# Patient Record
Sex: MALE | Race: WHITE | URBAN - METROPOLITAN AREA
[De-identification: names, ages, dates, MRNs, and addresses within clinical notes are randomized per-mention and may not be internally consistent; named-entity substitution may affect disease eponyms.]

---

## 2017-02-14 ENCOUNTER — NURSE ONLY (OUTPATIENT)
Dept: CARDIOLOGY CLINIC | Age: 77
End: 2017-02-14

## 2017-02-14 DIAGNOSIS — Z95.0 CARDIAC PACEMAKER IN SITU: ICD-10-CM

## 2017-02-14 DIAGNOSIS — R00.1 BRADYCARDIA: ICD-10-CM

## 2017-02-14 PROCEDURE — 93294 REM INTERROG EVL PM/LDLS PM: CPT | Performed by: INTERNAL MEDICINE

## 2017-02-14 PROCEDURE — 93296 REM INTERROG EVL PM/IDS: CPT | Performed by: INTERNAL MEDICINE

## 2017-05-16 ENCOUNTER — NURSE ONLY (OUTPATIENT)
Dept: CARDIOLOGY CLINIC | Age: 77
End: 2017-05-16

## 2017-05-16 DIAGNOSIS — R00.1 BRADYCARDIA: ICD-10-CM

## 2017-05-16 DIAGNOSIS — Z95.0 CARDIAC PACEMAKER IN SITU: ICD-10-CM

## 2017-05-16 PROCEDURE — 93296 REM INTERROG EVL PM/IDS: CPT | Performed by: INTERNAL MEDICINE

## 2017-05-16 PROCEDURE — 93294 REM INTERROG EVL PM/LDLS PM: CPT | Performed by: INTERNAL MEDICINE

## 2017-05-18 ENCOUNTER — TELEPHONE (OUTPATIENT)
Dept: CARDIOLOGY CLINIC | Age: 77
End: 2017-05-18

## 2017-08-15 ENCOUNTER — NURSE ONLY (OUTPATIENT)
Dept: CARDIOLOGY CLINIC | Age: 77
End: 2017-08-15

## 2017-08-15 DIAGNOSIS — Z95.0 CARDIAC PACEMAKER IN SITU: ICD-10-CM

## 2017-08-15 DIAGNOSIS — R00.1 BRADYCARDIA: ICD-10-CM

## 2017-08-15 PROCEDURE — 93296 REM INTERROG EVL PM/IDS: CPT | Performed by: INTERNAL MEDICINE

## 2017-08-15 PROCEDURE — 93294 REM INTERROG EVL PM/LDLS PM: CPT | Performed by: INTERNAL MEDICINE

## 2017-11-14 ENCOUNTER — NURSE ONLY (OUTPATIENT)
Dept: CARDIOLOGY CLINIC | Age: 77
End: 2017-11-14

## 2017-11-14 DIAGNOSIS — R00.1 BRADYCARDIA: ICD-10-CM

## 2017-11-14 DIAGNOSIS — Z95.0 CARDIAC PACEMAKER IN SITU: ICD-10-CM

## 2017-11-14 PROCEDURE — 93296 REM INTERROG EVL PM/IDS: CPT | Performed by: INTERNAL MEDICINE

## 2017-11-14 PROCEDURE — 93294 REM INTERROG EVL PM/LDLS PM: CPT | Performed by: INTERNAL MEDICINE

## 2017-11-27 ENCOUNTER — TELEPHONE (OUTPATIENT)
Dept: CARDIOLOGY CLINIC | Age: 77
End: 2017-11-27

## 2018-01-10 ENCOUNTER — HOSPITAL ENCOUNTER (OUTPATIENT)
Dept: OTHER | Age: 78
Discharge: HOME | End: 2018-01-31
Attending: INTERNAL MEDICINE | Admitting: INTERNAL MEDICINE

## 2018-01-10 LAB
ANION GAP SERPL CALCULATED.3IONS-SCNC: 15 MMOL/L (ref 3–16)
BUN BLDV-MCNC: 25 MG/DL (ref 7–20)
CALCIUM SERPL-MCNC: 9.2 MG/DL (ref 8.3–10.6)
CHLORIDE BLD-SCNC: 101 MMOL/L (ref 99–110)
CO2: 22 MMOL/L (ref 21–32)
CREAT SERPL-MCNC: 1.1 MG/DL (ref 0.8–1.3)
GFR AFRICAN AMERICAN: >60
GFR NON-AFRICAN AMERICAN: >60
GLUCOSE BLD-MCNC: 122 MG/DL (ref 70–99)
POTASSIUM SERPL-SCNC: 5.5 MMOL/L (ref 3.5–5.1)
SODIUM BLD-SCNC: 138 MMOL/L (ref 136–145)

## 2018-02-01 ENCOUNTER — HOSPITAL ENCOUNTER (OUTPATIENT)
Dept: OTHER | Age: 78
Discharge: HOME | End: 2018-02-28
Attending: INTERNAL MEDICINE | Admitting: INTERNAL MEDICINE

## 2019-09-04 ENCOUNTER — HOSPITAL ENCOUNTER (INPATIENT)
Age: 79
LOS: 6 days | Discharge: NURSING, REHAB OR LONG-TERM CARE | DRG: 292 | End: 2019-09-10
Attending: EMERGENCY MEDICINE | Admitting: INTERNAL MEDICINE
Payer: MEDICARE

## 2019-09-04 ENCOUNTER — APPOINTMENT (OUTPATIENT)
Dept: CT IMAGING | Age: 79
DRG: 292 | End: 2019-09-04
Payer: MEDICARE

## 2019-09-04 ENCOUNTER — APPOINTMENT (OUTPATIENT)
Dept: GENERAL RADIOLOGY | Age: 79
DRG: 292 | End: 2019-09-04
Payer: MEDICARE

## 2019-09-04 DIAGNOSIS — R77.8 ELEVATED TROPONIN: Primary | ICD-10-CM
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
## 2019-09-04 LAB
A/G RATIO: 1.1 (ref 1.1–2.2)
ALBUMIN SERPL-MCNC: 3.8 G/DL (ref 3.4–5)
ALP BLD-CCNC: 239 U/L (ref 40–129)
ALT SERPL-CCNC: 27 U/L (ref 10–40)
ANION GAP SERPL CALCULATED.3IONS-SCNC: 12 MMOL/L (ref 3–16)
APTT: 35.4 SEC (ref 26–36)
APTT: 35.8 SEC (ref 26–36)
AST SERPL-CCNC: 34 U/L (ref 15–37)
BASOPHILS ABSOLUTE: 0 K/UL (ref 0–0.2)
BASOPHILS RELATIVE PERCENT: 0.4 %
BILIRUB SERPL-MCNC: 1.9 MG/DL (ref 0–1)
BILIRUBIN URINE: ABNORMAL
BLOOD, URINE: NEGATIVE
BUN BLDV-MCNC: 23 MG/DL (ref 7–20)
CALCIUM SERPL-MCNC: 9.4 MG/DL (ref 8.3–10.6)
CHLORIDE BLD-SCNC: 105 MMOL/L (ref 99–110)
CLARITY: ABNORMAL
CO2: 25 MMOL/L (ref 21–32)
COLOR: ABNORMAL
CREAT SERPL-MCNC: 1.1 MG/DL (ref 0.8–1.3)
EKG ATRIAL RATE: 88 BPM
EKG DIAGNOSIS: NORMAL
EKG Q-T INTERVAL: 484 MS
EKG QRS DURATION: 128 MS
EKG QTC CALCULATION (BAZETT): 525 MS
EKG R AXIS: -76 DEGREES
EKG T AXIS: 63 DEGREES
EKG VENTRICULAR RATE: 71 BPM
EOSINOPHILS ABSOLUTE: 0 K/UL (ref 0–0.6)
EOSINOPHILS RELATIVE PERCENT: 0.3 %
EPITHELIAL CELLS, UA: 1 /HPF (ref 0–5)
GFR AFRICAN AMERICAN: >60
GFR NON-AFRICAN AMERICAN: >60
GLOBULIN: 3.5 G/DL
GLUCOSE BLD-MCNC: 124 MG/DL (ref 70–99)
GLUCOSE BLD-MCNC: 130 MG/DL (ref 70–99)
GLUCOSE URINE: NEGATIVE MG/DL
HCT VFR BLD CALC: 32 % (ref 40.5–52.5)
HEMOGLOBIN: 9.7 G/DL (ref 13.5–17.5)
HYALINE CASTS: 2 /LPF (ref 0–8)
INR BLD: 2.02 (ref 0.86–1.14)
KETONES, URINE: NEGATIVE MG/DL
LEUKOCYTE ESTERASE, URINE: NEGATIVE
LYMPHOCYTES ABSOLUTE: 0.6 K/UL (ref 1–5.1)
LYMPHOCYTES RELATIVE PERCENT: 11.8 %
MCH RBC QN AUTO: 25 PG (ref 26–34)
MCHC RBC AUTO-ENTMCNC: 30.2 G/DL (ref 31–36)
MCV RBC AUTO: 82.7 FL (ref 80–100)
MICROSCOPIC EXAMINATION: YES
MONOCYTES ABSOLUTE: 0.6 K/UL (ref 0–1.3)
MONOCYTES RELATIVE PERCENT: 12.8 %
NEUTROPHILS ABSOLUTE: 3.5 K/UL (ref 1.7–7.7)
NEUTROPHILS RELATIVE PERCENT: 74.7 %
NITRITE, URINE: NEGATIVE
PDW BLD-RTO: 20 % (ref 12.4–15.4)
PERFORMED ON: ABNORMAL
PH UA: 5 (ref 5–8)
PLATELET # BLD: 140 K/UL (ref 135–450)
PMV BLD AUTO: 7.9 FL (ref 5–10.5)
POTASSIUM SERPL-SCNC: 4.6 MMOL/L (ref 3.5–5.1)
PRO-BNP: 3444 PG/ML (ref 0–449)
PROTEIN UA: 100 MG/DL
PROTHROMBIN TIME: 23 SEC (ref 9.8–13)
RBC # BLD: 3.86 M/UL (ref 4.2–5.9)
RBC UA: 6 /HPF (ref 0–4)
SODIUM BLD-SCNC: 142 MMOL/L (ref 136–145)
SPECIFIC GRAVITY UA: 1.02 (ref 1–1.03)
TOTAL CK: 204 U/L (ref 39–308)
TOTAL PROTEIN: 7.3 G/DL (ref 6.4–8.2)
TROPONIN: 0.04 NG/ML
TROPONIN: 0.04 NG/ML
URINE REFLEX TO CULTURE: ABNORMAL
URINE TYPE: ABNORMAL
UROBILINOGEN, URINE: 0.2 E.U./DL
WBC # BLD: 4.7 K/UL (ref 4–11)
WBC UA: 2 /HPF (ref 0–5)

## 2019-09-04 PROCEDURE — 83880 ASSAY OF NATRIURETIC PEPTIDE: CPT

## 2019-09-04 PROCEDURE — 99285 EMERGENCY DEPT VISIT HI MDM: CPT

## 2019-09-04 PROCEDURE — 6360000002 HC RX W HCPCS: Performed by: INTERNAL MEDICINE

## 2019-09-04 PROCEDURE — 71045 X-RAY EXAM CHEST 1 VIEW: CPT

## 2019-09-04 PROCEDURE — 85025 COMPLETE CBC W/AUTO DIFF WBC: CPT

## 2019-09-04 PROCEDURE — 73502 X-RAY EXAM HIP UNI 2-3 VIEWS: CPT

## 2019-09-04 PROCEDURE — 93005 ELECTROCARDIOGRAM TRACING: CPT | Performed by: NURSE PRACTITIONER

## 2019-09-04 PROCEDURE — 82550 ASSAY OF CK (CPK): CPT

## 2019-09-04 PROCEDURE — 6370000000 HC RX 637 (ALT 250 FOR IP): Performed by: INTERNAL MEDICINE

## 2019-09-04 PROCEDURE — 80053 COMPREHEN METABOLIC PANEL: CPT

## 2019-09-04 PROCEDURE — 36415 COLL VENOUS BLD VENIPUNCTURE: CPT

## 2019-09-04 PROCEDURE — 85610 PROTHROMBIN TIME: CPT

## 2019-09-04 PROCEDURE — 73590 X-RAY EXAM OF LOWER LEG: CPT

## 2019-09-04 PROCEDURE — 84484 ASSAY OF TROPONIN QUANT: CPT

## 2019-09-04 PROCEDURE — 2060000000 HC ICU INTERMEDIATE R&B

## 2019-09-04 PROCEDURE — 81001 URINALYSIS AUTO W/SCOPE: CPT

## 2019-09-04 PROCEDURE — 85730 THROMBOPLASTIN TIME PARTIAL: CPT

## 2019-09-04 PROCEDURE — 93010 ELECTROCARDIOGRAM REPORT: CPT | Performed by: INTERNAL MEDICINE

## 2019-09-04 PROCEDURE — 2580000003 HC RX 258: Performed by: INTERNAL MEDICINE

## 2019-09-04 PROCEDURE — 70450 CT HEAD/BRAIN W/O DYE: CPT

## 2019-09-04 PROCEDURE — 6370000000 HC RX 637 (ALT 250 FOR IP): Performed by: EMERGENCY MEDICINE

## 2019-09-04 RX ORDER — LOSARTAN POTASSIUM 100 MG/1
100 TABLET ORAL NIGHTLY
Status: DISCONTINUED | OUTPATIENT
Start: 2019-09-04 | End: 2019-09-06

## 2019-09-04 RX ORDER — ATORVASTATIN CALCIUM 40 MG/1
40 TABLET, FILM COATED ORAL NIGHTLY
Status: DISCONTINUED | OUTPATIENT
Start: 2019-09-04 | End: 2019-09-10 | Stop reason: HOSPADM

## 2019-09-04 RX ORDER — HEPARIN SODIUM 1000 [USP'U]/ML
4000 INJECTION, SOLUTION INTRAVENOUS; SUBCUTANEOUS PRN
Status: DISCONTINUED | OUTPATIENT
Start: 2019-09-04 | End: 2019-09-05

## 2019-09-04 RX ORDER — SODIUM CHLORIDE 0.9 % (FLUSH) 0.9 %
10 SYRINGE (ML) INJECTION PRN
Status: DISCONTINUED | OUTPATIENT
Start: 2019-09-04 | End: 2019-09-10 | Stop reason: HOSPADM

## 2019-09-04 RX ORDER — NICOTINE POLACRILEX 4 MG
15 LOZENGE BUCCAL PRN
Status: DISCONTINUED | OUTPATIENT
Start: 2019-09-04 | End: 2019-09-10 | Stop reason: HOSPADM

## 2019-09-04 RX ORDER — FINASTERIDE 5 MG/1
5 TABLET, FILM COATED ORAL DAILY
Status: DISCONTINUED | OUTPATIENT
Start: 2019-09-05 | End: 2019-09-05

## 2019-09-04 RX ORDER — HYDROCODONE BITARTRATE AND ACETAMINOPHEN 5; 325 MG/1; MG/1
1 TABLET ORAL EVERY 6 HOURS PRN
Status: DISCONTINUED | OUTPATIENT
Start: 2019-09-04 | End: 2019-09-10 | Stop reason: HOSPADM

## 2019-09-04 RX ORDER — HYDROCHLOROTHIAZIDE 25 MG/1
25 TABLET ORAL DAILY
Status: DISCONTINUED | OUTPATIENT
Start: 2019-09-04 | End: 2019-09-05

## 2019-09-04 RX ORDER — ATENOLOL 25 MG/1
25 TABLET ORAL 2 TIMES DAILY
Status: DISCONTINUED | OUTPATIENT
Start: 2019-09-04 | End: 2019-09-10 | Stop reason: HOSPADM

## 2019-09-04 RX ORDER — ASPIRIN 81 MG/1
81 TABLET, CHEWABLE ORAL DAILY
Status: DISCONTINUED | OUTPATIENT
Start: 2019-09-04 | End: 2019-09-10 | Stop reason: HOSPADM

## 2019-09-04 RX ORDER — HEPARIN SODIUM 10000 [USP'U]/100ML
9.2 INJECTION, SOLUTION INTRAVENOUS CONTINUOUS
Status: DISCONTINUED | OUTPATIENT
Start: 2019-09-04 | End: 2019-09-05

## 2019-09-04 RX ORDER — DILTIAZEM HYDROCHLORIDE 60 MG/1
240 CAPSULE, EXTENDED RELEASE ORAL DAILY
Status: DISCONTINUED | OUTPATIENT
Start: 2019-09-05 | End: 2019-09-05

## 2019-09-04 RX ORDER — DEXTROSE MONOHYDRATE 25 G/50ML
12.5 INJECTION, SOLUTION INTRAVENOUS PRN
Status: DISCONTINUED | OUTPATIENT
Start: 2019-09-04 | End: 2019-09-10 | Stop reason: HOSPADM

## 2019-09-04 RX ORDER — HEPARIN SODIUM 1000 [USP'U]/ML
4000 INJECTION, SOLUTION INTRAVENOUS; SUBCUTANEOUS ONCE
Status: COMPLETED | OUTPATIENT
Start: 2019-09-04 | End: 2019-09-04

## 2019-09-04 RX ORDER — DEXTROSE MONOHYDRATE 50 MG/ML
100 INJECTION, SOLUTION INTRAVENOUS PRN
Status: DISCONTINUED | OUTPATIENT
Start: 2019-09-04 | End: 2019-09-10 | Stop reason: HOSPADM

## 2019-09-04 RX ORDER — AMOXICILLIN 250 MG/1
500 CAPSULE ORAL 2 TIMES DAILY
Status: DISCONTINUED | OUTPATIENT
Start: 2019-09-04 | End: 2019-09-04 | Stop reason: ALTCHOICE

## 2019-09-04 RX ORDER — ONDANSETRON 2 MG/ML
4 INJECTION INTRAMUSCULAR; INTRAVENOUS EVERY 6 HOURS PRN
Status: DISCONTINUED | OUTPATIENT
Start: 2019-09-04 | End: 2019-09-10 | Stop reason: HOSPADM

## 2019-09-04 RX ORDER — HYDROCODONE BITARTRATE AND ACETAMINOPHEN 5; 325 MG/1; MG/1
2 TABLET ORAL ONCE
Status: COMPLETED | OUTPATIENT
Start: 2019-09-04 | End: 2019-09-04

## 2019-09-04 RX ORDER — HEPARIN SODIUM 1000 [USP'U]/ML
2000 INJECTION, SOLUTION INTRAVENOUS; SUBCUTANEOUS PRN
Status: DISCONTINUED | OUTPATIENT
Start: 2019-09-04 | End: 2019-09-05

## 2019-09-04 RX ORDER — SODIUM CHLORIDE 0.9 % (FLUSH) 0.9 %
10 SYRINGE (ML) INJECTION EVERY 12 HOURS SCHEDULED
Status: DISCONTINUED | OUTPATIENT
Start: 2019-09-04 | End: 2019-09-10 | Stop reason: HOSPADM

## 2019-09-04 RX ADMIN — HYDROCODONE BITARTRATE AND ACETAMINOPHEN 2 TABLET: 5; 325 TABLET ORAL at 16:26

## 2019-09-04 RX ADMIN — DESMOPRESSIN ACETATE 40 MG: 0.2 TABLET ORAL at 23:30

## 2019-09-04 RX ADMIN — ASPIRIN 81 MG 81 MG: 81 TABLET ORAL at 23:30

## 2019-09-04 RX ADMIN — HYDROCHLOROTHIAZIDE 25 MG: 25 TABLET ORAL at 23:30

## 2019-09-04 RX ADMIN — Medication 10 ML: at 23:31

## 2019-09-04 RX ADMIN — HEPARIN SODIUM 4000 UNITS: 1000 INJECTION, SOLUTION INTRAVENOUS; SUBCUTANEOUS at 23:32

## 2019-09-04 RX ADMIN — HEPARIN SODIUM AND DEXTROSE 13.2 UNITS/KG/HR: 10000; 5 INJECTION INTRAVENOUS at 23:32

## 2019-09-04 RX ADMIN — ATENOLOL 25 MG: 25 TABLET ORAL at 23:30

## 2019-09-04 ASSESSMENT — PAIN SCALES - GENERAL
PAINLEVEL_OUTOF10: 6
PAINLEVEL_OUTOF10: 8
PAINLEVEL_OUTOF10: 0
PAINLEVEL_OUTOF10: 4

## 2019-09-04 ASSESSMENT — ENCOUNTER SYMPTOMS
ABDOMINAL PAIN: 0
CHEST TIGHTNESS: 0
SHORTNESS OF BREATH: 0
DIARRHEA: 0
NAUSEA: 0
VOMITING: 0

## 2019-09-04 ASSESSMENT — PAIN DESCRIPTION - LOCATION
LOCATION_2: LEG
LOCATION: HIP

## 2019-09-04 ASSESSMENT — PAIN DESCRIPTION - PAIN TYPE: TYPE: ACUTE PAIN

## 2019-09-04 ASSESSMENT — PAIN DESCRIPTION - INTENSITY: RATING_2: 8

## 2019-09-04 ASSESSMENT — PAIN DESCRIPTION - ORIENTATION: ORIENTATION: RIGHT

## 2019-09-04 NOTE — ED NOTES
(GLUCOPHAGE) 1000 MG tablet Take 1,000 mg by mouth 2 times daily (with meals). L-Methylfolate-B6-B12 2.8-25-2 MG TABS Take  by mouth 2 times daily. [DISCONTINUED] Dutasteride-Tamsulosin HCl (ARIELA) 0.5-0.4 MG CAPS Take  by mouth. [DISCONTINUED] fish oil-omega-3 fatty acids 1000 MG capsule Take 2 g by mouth daily. [DISCONTINUED] aspirin 325 MG EC tablet Take 325 mg by mouth daily.

## 2019-09-04 NOTE — ED PROVIDER NOTES
[address] [facility]        Pt Name: Jimena Guadalupe  MRN: [id number]  [place] 1940  Date of evaluation: 9/4/2019  Provider: Marylene Berg, APRN - CNP  PCP: Jhonny Lawson MD    This patient was seen and evaluated by the attending physician [facility]       Chief Complaint   Patient presents with    Fall     pt brought in by [place] [facility], pt states he tripped over his feet this am and fell, c/o right hip and left ankle pain. Pt denies hitting his head       HISTORY OF PRESENT ILLNESS   (Location/Symptom, Timing/Onset, Context/Setting, Quality, Duration, Modifying Factors, Severity)  Note limiting factors. Jimena Guadalupe is a 78 y.o. male presents to the emergency department after falling this morning. The patient reports that he had to void, got up to use a urinal at 6. States that he went back to bed because his wife and not awaken. Reports that he needed to void again at 8 but did not have a urinal available upstairs, got on the stair lift to go down the stairs and the stair lift chair failed, states that he \"bailed out\", he was on the ground from 8:00 this morning until about 1300 today. It is unclear how he was found. Patient is complaining of pain to the left tib-fib and left hip. Denies other injury. Does not believe that he hit his head, denies loss of consciousness. Denies any headache, fever, lightheadedness, dizziness, visual disturbances. No chest pain or pressure. No neck or back pain. No shortness of breath, cough, or congestion. No abdominal pain, nausea, vomiting, diarrhea, constipation, or dysuria. Nursing Notes were all reviewed and agreed with or any disagreements were addressed  in the HPI. REVIEW OF SYSTEMS    (2-9 systems for level 4, 10 or more for level 5)     Review of Systems   Constitutional: Negative for activity change, chills and fever.    Respiratory: Negative for chest tightness and nightly      amiodarone (CORDARONE) 200 MG tablet Take 200 mg by mouth daily  Refills: 4      b complex vitamins tablet Take 1 tablet by mouth daily      candesartan (ATACAND) 32 MG tablet Take 32 mg by mouth daily  Refills: 1      docusate sodium (COLACE) 100 MG capsule Take 100 mg by mouth daily as needed      Polysaccharide Iron Complex 391.3 (180 Fe) MG CAPS Take 180 mg by mouth daily      rivaroxaban (XARELTO) 15 MG TABS tablet Take 15 mg by mouth Daily with supper      metFORMIN (GLUCOPHAGE) 1000 MG tablet Take 500 mg by mouth 2 times daily (with meals)               ALLERGIES     Patient has no known allergies. FAMILYHISTORY     No family history on file.        SOCIAL HISTORY       Social History     Socioeconomic History    Marital status:      Spouse name: Not on file    Number of children: Not on file    Years of education: Not on file    Highest education level: Not on file   Occupational History    Not on file   Social Needs    Financial resource strain: Not on file    Food insecurity:     Worry: Not on file     Inability: Not on file    Transportation needs:     Medical: Not on file     Non-medical: Not on file   Tobacco Use    Smoking status: Never Smoker    Smokeless tobacco: Never Used   Substance and Sexual Activity    Alcohol use: No    Drug use: No    Sexual activity: Not on file   Lifestyle    Physical activity:     Days per week: Not on file     Minutes per session: Not on file    Stress: Not on file   Relationships    Social connections:     Talks on phone: Not on file     Gets together: Not on file     Attends Anabaptist service: Not on file     Active member of club or organization: Not on file     Attends meetings of clubs or organizations: Not on file     Relationship status: Not on file    Intimate partner violence:     Fear of current or ex partner: Not on file     Emotionally abused: Not on file     Physically abused: Not on file     Forced sexual activity: Not on file   Other Topics Concern    Not on file   Social History Narrative    Not on file       SCREENINGS    Betty Coma Scale  Eye Opening: Spontaneous  Best Verbal Response: Oriented  Best Motor Response: Obeys commands  [place] Coma Scale Score: 15        PHYSICAL EXAM    (up to 7 for level 4, 8 or more for level 5)     ED Triage Vitals [09/04/19 1310]   BP Temp Temp Source Pulse Resp SpO2 Height Weight   (!) 155/84 98.2 °F (36.8 °C) Oral 75 21 (!) 88 % 6' 2\" (1.88 m) 240 lb (108.9 kg)       Physical Exam   Constitutional: He is oriented to person, place, and time. He appears well-developed and well-nourished. HENT:   Head: Normocephalic and atraumatic. Right Ear: External ear normal.   Left Ear: External ear normal.   Eyes: Right eye exhibits no discharge. Left eye exhibits no discharge. Neck: Normal range of motion. Neck supple. No JVD present. Cardiovascular: Normal rate and regular rhythm. Pulmonary/Chest: Effort normal. No respiratory distress. Abdominal: Soft. He exhibits no mass. There is no tenderness. Musculoskeletal: Normal range of motion. Left hip: He exhibits tenderness. Left lower leg: He exhibits tenderness. Neurological: He is alert and oriented to person, place, and time. Skin: Skin is warm and dry. He is not diaphoretic. No pallor. Psychiatric: He has a normal mood and affect. His behavior is normal.   Nursing note and vitals reviewed.       DIAGNOSTIC RESULTS   LABS:    Labs Reviewed   CBC WITH AUTO DIFFERENTIAL - Abnormal; Notable for the following components:       Result Value    RBC 3.86 (*)     Hemoglobin 9.7 (*)     Hematocrit 32.0 (*)     MCH 25.0 (*)     MCHC 30.2 (*)     RDW 20.0 (*)     Lymphocytes Absolute 0.6 (*)     All other components within normal limits    Narrative:     Performed at:  [facility] [address], [place], [address]   Phone [phone]   COMPREHENSIVE METABOLIC PANEL - Abnormal; Notable 09/04/19 14:42:23   Procedure changed from XR HIP LEFT (2-3 VIEWS)   Final result by Ceci Hayes MD (09/04/19 14:42:23)                Impression:    No acute bone or joint abnormality.                  CT Head WO Contrast (Final result)   Result time 09/04/19 14:15:59   Final result by Maci Padilla MD (09/04/19 14:15:59)                Impression:    No acute intracranial abnormality. Patient be disposition by attending physician as his visit did exceed the length of my shift. FINAL IMPRESSION      1. Elevated troponin    2.  Fall, initial encounter          DISPOSITION/PLAN   DISPOSITION Admitted 09/04/2019 06:02:55 PM      PATIENT REFERREDTO:  Damir Bee MD  [facility]  [address]  [phone]            DISCHARGE MEDICATIONS:  Current Discharge Medication List          DISCONTINUED MEDICATIONS:  Current Discharge Medication List      STOP taking these medications       glimepiride (AMARYL) 1 MG tablet Comments:   Reason for Stopping:         hydrochlorothiazide (HYDRODIURIL) 25 MG tablet Comments:   Reason for Stopping:         pioglitazone (ACTOS) 15 MG tablet Comments:   Reason for Stopping:         dutasteride (AVODART) 0.5 MG capsule Comments:   Reason for Stopping:         diltiazem (DILACOR XR) 240 MG XR capsule Comments:   Reason for Stopping:         Vitamin D (CHOLECALCIFEROL) 1000 UNITS CAPS capsule Comments:   Reason for Stopping:         Multiple Vitamins-Minerals (CENTRUM SILVER) TABS Comments:   Reason for Stopping:         irbesartan (AVAPRO) 300 MG tablet Comments:   Reason for Stopping:         L-Methylfolate-B6-B12 2.8-25-2 MG TABS Comments:   Reason for Stopping:                      (Please note that portions ofthis note were completed with a voice recognition program.  Efforts were made to edit the dictations but occasionally words are mis-transcribed.)    DARRION Merchant - CNP (electronically signed)           Claude Bhakta

## 2019-09-05 LAB
A/G RATIO: 1 (ref 1.1–2.2)
ALBUMIN SERPL-MCNC: 3.4 G/DL (ref 3.4–5)
ALP BLD-CCNC: 222 U/L (ref 40–129)
ALT SERPL-CCNC: 28 U/L (ref 10–40)
ANION GAP SERPL CALCULATED.3IONS-SCNC: 9 MMOL/L (ref 3–16)
APTT: 35.2 SEC (ref 26–36)
APTT: 37.3 SEC (ref 26–36)
APTT: 98.4 SEC (ref 26–36)
AST SERPL-CCNC: 38 U/L (ref 15–37)
BILIRUB SERPL-MCNC: 1.7 MG/DL (ref 0–1)
BUN BLDV-MCNC: 24 MG/DL (ref 7–20)
CALCIUM SERPL-MCNC: 9.1 MG/DL (ref 8.3–10.6)
CHLORIDE BLD-SCNC: 106 MMOL/L (ref 99–110)
CO2: 26 MMOL/L (ref 21–32)
CREAT SERPL-MCNC: 1.2 MG/DL (ref 0.8–1.3)
GFR AFRICAN AMERICAN: >60
GFR NON-AFRICAN AMERICAN: 58
GLOBULIN: 3.5 G/DL
GLUCOSE BLD-MCNC: 128 MG/DL (ref 70–99)
GLUCOSE BLD-MCNC: 148 MG/DL (ref 70–99)
GLUCOSE BLD-MCNC: 161 MG/DL (ref 70–99)
GLUCOSE BLD-MCNC: 179 MG/DL (ref 70–99)
GLUCOSE BLD-MCNC: 179 MG/DL (ref 70–99)
GLUCOSE BLD-MCNC: 192 MG/DL (ref 70–99)
GLUCOSE BLD-MCNC: 195 MG/DL (ref 70–99)
HCT VFR BLD CALC: 30.8 % (ref 40.5–52.5)
HEMOGLOBIN: 9.4 G/DL (ref 13.5–17.5)
LV EF: 35 %
LVEF MODALITY: NORMAL
MCH RBC QN AUTO: 25.1 PG (ref 26–34)
MCHC RBC AUTO-ENTMCNC: 30.4 G/DL (ref 31–36)
MCV RBC AUTO: 82.7 FL (ref 80–100)
PDW BLD-RTO: 21 % (ref 12.4–15.4)
PERFORMED ON: ABNORMAL
PLATELET # BLD: 135 K/UL (ref 135–450)
PMV BLD AUTO: 8.1 FL (ref 5–10.5)
POTASSIUM REFLEX MAGNESIUM: 5.1 MMOL/L (ref 3.5–5.1)
RBC # BLD: 3.73 M/UL (ref 4.2–5.9)
SODIUM BLD-SCNC: 141 MMOL/L (ref 136–145)
TOTAL PROTEIN: 6.9 G/DL (ref 6.4–8.2)
TROPONIN: 0.04 NG/ML
WBC # BLD: 3.9 K/UL (ref 4–11)

## 2019-09-05 PROCEDURE — 6360000002 HC RX W HCPCS: Performed by: INTERNAL MEDICINE

## 2019-09-05 PROCEDURE — 6370000000 HC RX 637 (ALT 250 FOR IP): Performed by: HOSPITALIST

## 2019-09-05 PROCEDURE — 85027 COMPLETE CBC AUTOMATED: CPT

## 2019-09-05 PROCEDURE — 6370000000 HC RX 637 (ALT 250 FOR IP): Performed by: INTERNAL MEDICINE

## 2019-09-05 PROCEDURE — 80053 COMPREHEN METABOLIC PANEL: CPT

## 2019-09-05 PROCEDURE — 2580000003 HC RX 258: Performed by: INTERNAL MEDICINE

## 2019-09-05 PROCEDURE — 94640 AIRWAY INHALATION TREATMENT: CPT

## 2019-09-05 PROCEDURE — 2700000000 HC OXYGEN THERAPY PER DAY

## 2019-09-05 PROCEDURE — 93306 TTE W/DOPPLER COMPLETE: CPT

## 2019-09-05 PROCEDURE — 84484 ASSAY OF TROPONIN QUANT: CPT

## 2019-09-05 PROCEDURE — 94761 N-INVAS EAR/PLS OXIMETRY MLT: CPT

## 2019-09-05 PROCEDURE — 85730 THROMBOPLASTIN TIME PARTIAL: CPT

## 2019-09-05 PROCEDURE — 6360000002 HC RX W HCPCS: Performed by: HOSPITALIST

## 2019-09-05 PROCEDURE — 36415 COLL VENOUS BLD VENIPUNCTURE: CPT

## 2019-09-05 PROCEDURE — 99223 1ST HOSP IP/OBS HIGH 75: CPT | Performed by: INTERNAL MEDICINE

## 2019-09-05 PROCEDURE — 2060000000 HC ICU INTERMEDIATE R&B

## 2019-09-05 RX ORDER — TAMSULOSIN HYDROCHLORIDE 0.4 MG/1
0.4 CAPSULE ORAL DAILY
Status: DISCONTINUED | OUTPATIENT
Start: 2019-09-05 | End: 2019-09-10 | Stop reason: HOSPADM

## 2019-09-05 RX ORDER — TORSEMIDE 10 MG/1
50 TABLET ORAL DAILY
Status: ON HOLD | COMMUNITY
Start: 2019-07-17 | End: 2019-09-10 | Stop reason: HOSPADM

## 2019-09-05 RX ORDER — ALFUZOSIN HYDROCHLORIDE 10 MG/1
10 TABLET, EXTENDED RELEASE ORAL NIGHTLY
COMMUNITY

## 2019-09-05 RX ORDER — PANTOPRAZOLE SODIUM 40 MG/1
40 TABLET, DELAYED RELEASE ORAL DAILY
COMMUNITY
Start: 2019-06-15

## 2019-09-05 RX ORDER — DOCUSATE SODIUM 100 MG/1
100 CAPSULE, LIQUID FILLED ORAL DAILY PRN
COMMUNITY

## 2019-09-05 RX ORDER — CANDESARTAN 32 MG/1
32 TABLET ORAL DAILY
Refills: 1 | COMMUNITY
Start: 2019-05-29

## 2019-09-05 RX ORDER — IPRATROPIUM BROMIDE AND ALBUTEROL SULFATE 2.5; .5 MG/3ML; MG/3ML
1 SOLUTION RESPIRATORY (INHALATION)
Status: DISCONTINUED | OUTPATIENT
Start: 2019-09-05 | End: 2019-09-10 | Stop reason: HOSPADM

## 2019-09-05 RX ORDER — FUROSEMIDE 10 MG/ML
20 INJECTION INTRAMUSCULAR; INTRAVENOUS 2 TIMES DAILY
Status: DISCONTINUED | OUTPATIENT
Start: 2019-09-05 | End: 2019-09-06

## 2019-09-05 RX ORDER — FUROSEMIDE 10 MG/ML
40 INJECTION INTRAMUSCULAR; INTRAVENOUS ONCE
Status: COMPLETED | OUTPATIENT
Start: 2019-09-05 | End: 2019-09-05

## 2019-09-05 RX ORDER — AMIODARONE HYDROCHLORIDE 200 MG/1
200 TABLET ORAL DAILY
Status: DISCONTINUED | OUTPATIENT
Start: 2019-09-05 | End: 2019-09-10 | Stop reason: HOSPADM

## 2019-09-05 RX ORDER — AMIODARONE HYDROCHLORIDE 200 MG/1
200 TABLET ORAL DAILY
Refills: 4 | COMMUNITY
Start: 2019-05-29

## 2019-09-05 RX ORDER — AMOXICILLIN 250 MG/1
500 CAPSULE ORAL 2 TIMES DAILY
Status: DISCONTINUED | OUTPATIENT
Start: 2019-09-05 | End: 2019-09-10

## 2019-09-05 RX ORDER — ACETAMINOPHEN 500 MG
500 TABLET ORAL EVERY 6 HOURS PRN
COMMUNITY

## 2019-09-05 RX ADMIN — DILTIAZEM HYDROCHLORIDE 240 MG: 60 CAPSULE, EXTENDED RELEASE ORAL at 09:21

## 2019-09-05 RX ADMIN — RIVAROXABAN 15 MG: 15 TABLET, FILM COATED ORAL at 12:28

## 2019-09-05 RX ADMIN — IPRATROPIUM BROMIDE AND ALBUTEROL SULFATE 1 AMPULE: .5; 3 SOLUTION RESPIRATORY (INHALATION) at 16:16

## 2019-09-05 RX ADMIN — VITAMIN D, TAB 1000IU (100/BT) 1000 UNITS: 25 TAB at 09:21

## 2019-09-05 RX ADMIN — ASPIRIN 81 MG 81 MG: 81 TABLET ORAL at 09:21

## 2019-09-05 RX ADMIN — INSULIN LISPRO 1 UNITS: 100 INJECTION, SOLUTION INTRAVENOUS; SUBCUTANEOUS at 00:08

## 2019-09-05 RX ADMIN — INSULIN LISPRO 1 UNITS: 100 INJECTION, SOLUTION INTRAVENOUS; SUBCUTANEOUS at 13:38

## 2019-09-05 RX ADMIN — AMOXICILLIN 500 MG: 250 CAPSULE ORAL at 09:21

## 2019-09-05 RX ADMIN — INSULIN LISPRO 1 UNITS: 100 INJECTION, SOLUTION INTRAVENOUS; SUBCUTANEOUS at 18:01

## 2019-09-05 RX ADMIN — Medication 10 ML: at 09:21

## 2019-09-05 RX ADMIN — FINASTERIDE 5 MG: 5 TABLET, FILM COATED ORAL at 09:21

## 2019-09-05 RX ADMIN — FUROSEMIDE 20 MG: 10 INJECTION, SOLUTION INTRAMUSCULAR; INTRAVENOUS at 19:42

## 2019-09-05 RX ADMIN — AMOXICILLIN 500 MG: 250 CAPSULE ORAL at 21:36

## 2019-09-05 RX ADMIN — VITAMIN D, TAB 1000IU (100/BT) 1000 UNITS: 25 TAB at 00:06

## 2019-09-05 RX ADMIN — HYDROCHLOROTHIAZIDE 25 MG: 25 TABLET ORAL at 09:21

## 2019-09-05 RX ADMIN — LOSARTAN POTASSIUM 100 MG: 100 TABLET, FILM COATED ORAL at 21:33

## 2019-09-05 RX ADMIN — AMOXICILLIN 500 MG: 250 CAPSULE ORAL at 01:30

## 2019-09-05 RX ADMIN — LOSARTAN POTASSIUM 100 MG: 100 TABLET, FILM COATED ORAL at 00:06

## 2019-09-05 RX ADMIN — IPRATROPIUM BROMIDE AND ALBUTEROL SULFATE 1 AMPULE: .5; 3 SOLUTION RESPIRATORY (INHALATION) at 19:44

## 2019-09-05 RX ADMIN — ATENOLOL 25 MG: 25 TABLET ORAL at 21:33

## 2019-09-05 RX ADMIN — ATENOLOL 25 MG: 25 TABLET ORAL at 09:20

## 2019-09-05 RX ADMIN — DESMOPRESSIN ACETATE 40 MG: 0.2 TABLET ORAL at 21:33

## 2019-09-05 RX ADMIN — INSULIN LISPRO 1 UNITS: 100 INJECTION, SOLUTION INTRAVENOUS; SUBCUTANEOUS at 21:30

## 2019-09-05 RX ADMIN — FUROSEMIDE 40 MG: 10 INJECTION, SOLUTION INTRAMUSCULAR; INTRAVENOUS at 12:22

## 2019-09-05 RX ADMIN — TAMSULOSIN HYDROCHLORIDE 0.4 MG: 0.4 CAPSULE ORAL at 12:28

## 2019-09-05 RX ADMIN — AMIODARONE HYDROCHLORIDE 200 MG: 200 TABLET ORAL at 12:23

## 2019-09-05 ASSESSMENT — PAIN SCALES - GENERAL
PAINLEVEL_OUTOF10: 0
PAINLEVEL_OUTOF10: 0
PAINLEVEL_OUTOF10: 5
PAINLEVEL_OUTOF10: 0
PAINLEVEL_OUTOF10: 0
PAINLEVEL_OUTOF10: 6
PAINLEVEL_OUTOF10: 0
PAINLEVEL_OUTOF10: 0

## 2019-09-05 ASSESSMENT — PAIN DESCRIPTION - ORIENTATION
ORIENTATION: LEFT;LOWER
ORIENTATION: RIGHT;LEFT

## 2019-09-05 ASSESSMENT — PAIN DESCRIPTION - LOCATION
LOCATION: SHOULDER
LOCATION: LEG

## 2019-09-05 ASSESSMENT — PAIN DESCRIPTION - PAIN TYPE
TYPE: ACUTE PAIN
TYPE: ACUTE PAIN

## 2019-09-05 NOTE — PROGRESS NOTES
Objective:  /66   Pulse 70   Temp 97.7 °F (36.5 °C) (Oral)   Resp 16   Ht 6' 2\" (1.88 m)   Wt 260 lb 6.4 oz (118.1 kg)   SpO2 96%   BMI 33.43 kg/m²     Intake/Output Summary (Last 24 hours) at 9/5/2019 1330  Last data filed at 9/5/2019 1000  Gross per 24 hour   Intake 540 ml   Output --   Net 540 ml    Wt Readings from Last 3 Encounters:   09/05/19 260 lb 6.4 oz (118.1 kg)   07/10/14 259 lb (117.5 kg)   06/17/14 268 lb (121.6 kg)       General appearance: Elderly  male, appears comfortable  Eyes: Sclera clear. Pupils equal.  ENT: Moist oral mucosa. Trachea midline, no adenopathy. Cardiovascular: Regular rhythm, normal S1, S2. No murmur. No edema in lower extremities, pacemaker intact in left anterior chest wall  Respiratory: Not using accessory muscles. Good inspiratory effort. Left basilar diminished lung sounds noted bilateral both sides wheezing. GI: Abdomen soft, no tenderness, not distended, normal bowel sounds  Musculoskeletal: No cyanosis in digits, neck supple  Neurology: CN 2-12 grossly intact. No speech or motor deficits  Psych: Normal affect. Alert and oriented in time, place and person  Skin: Warm, dry, normal turgor    Labs and Tests:  CBC:   Recent Labs     09/04/19 1427 09/05/19  0435   WBC 4.7 3.9*   HGB 9.7* 9.4*    135     BMP:  Recent Labs     09/04/19  1427 09/05/19  0435    141   K 4.6 5.1    106   CO2 25 26   BUN 23* 24*   CREATININE 1.1 1.2   GLUCOSE 130* 148*     Hepatic: Recent Labs     09/04/19  1427 09/05/19  0435   AST 34 38*   ALT 27 28   BILITOT 1.9* 1.7*   ALKPHOS 239* 222*         Problem List  Active Problems:    Fall    Elevated troponin  Resolved Problems:    * No resolved hospital problems. *       Assessment & Plan: 1. Mechanical injury to the left leg no evidence of fracture. Physical and occupational therapy consulted   2.   Patient looks like in fluid overload exam showed anasarca around belly,  thighs and lower legs he has anasarca, albumin and globulin level looks like normal, we will check 2D echo with Doppler, wheezing noted in the we will start exam, breathing treatments and monitor. Continue with strict intake and output and daily weights, continue Lasix 20 mg twice daily intravenously. 3.Elevated troponin  0.04, Hx of CAD+ status post CABG+ status post pacemaker. cardiology consulted, stable ischemic heart disease, troponin elevation likely secondary to mild congestion, stop heparin drip   4. Blood pressure stable continue atenolol  5. Type 2 diabetes mellitus, continue sliding scale insulin  6.   Paroxysmal atrial fibrillation, continue Xarelto rate controlled            Diet: DIET CARB CONTROL; Carb Control: 4 carb choices (60 gms)/meal; Daily Fluid Restriction: 2000 ml  Code:Full Code  DVT PPX lovenox       Felix Hernandez MD   9/5/2019 1:30 PM

## 2019-09-05 NOTE — PLAN OF CARE
Problem: Falls - Risk of:  Goal: Will remain free from falls  Description  Will remain free from falls  Outcome: Met This Shift  Note:   Pt remains free from falls. Safety precautions in place. Bed in lowest position, bed wheels locked, call light with in reach, bed alarm and non-skid socks on, yellow blanket in place, fall risk wrist band on. Oriented pt to the floor, room, and call light. Will continue to monitor. Problem: Risk for Impaired Skin Integrity  Goal: Tissue integrity - skin and mucous membranes  Description  Structural intactness and normal physiological function of skin and  mucous membranes. Outcome: Ongoing  Note:   Pt has generalized bruising BUE, skin tear on left upper arm, R upper arm above elbow and BLE. All extremities are swollen. Mepilex dressing is applied to skin tear on BUE. Will continue assess his skin integrity, change dressing in need. Problem: Pain:  Goal: Pain level will decrease  Description  Pain level will decrease  Outcome: Met This Shift  Note:   Pt complains lower leg pain r/t recent fall, but refuses to taken any pain medicine. Pt states that he does not need pain relief med at the moment.

## 2019-09-05 NOTE — ED PROVIDER NOTES
I  cared for and evaluated the patient in conjunction with the ED Advanced Practice Provider    [address] [facility] D Martha Joshua is a 78 y.o. male presents to the emergency department chief complaint of fall that occurred at home. The patient states that he fell on his stairs at home he denies a loss of consciousness but states that he was unable to stand afterwards and laid on the floor for several hours. He denies any chest pain he denies any shortness of breath. He complains of generalized weakness. Rates his pain in the hips and ankles as a 7 out of 10. He denies any numbness or tingling he denies incontinence of bowel or bladder. No recent medication changes denies any blood thinners. PHYSICAL EXAM  /81   Pulse 72   Temp 97.5 °F (36.4 °C) (Oral)   Resp 12   Ht 6' 2\" (1.88 m)   Wt 256 lb 9.6 oz (116.4 kg)   SpO2 93%   BMI 32.95 kg/m²     On exam, the patient appears in no acute distress. Speech is clear. Breathing is unlabored. Moves all extremities, no gross deformities  Dermatomes intact to light touch throughout the upper and lower extremities. Distal capillary refill is under 3 seconds and radial pulses are 2 out of 4 and equal bilaterally    Patient was given medications for pain and symptomatic control on reevaluation he was unable to ambulate some to have multiple electrolyte abnormalities and elevation of troponin if he would benefit from definitive inpatient treatment is discussed with the hospitalist who agreed to accept the patient to their service. EKG is performed that shows a sinus paced rhythm at a rate of 71 bpm there is no acute ST elevation no acute T wave inversion this is unchanged from prior EKG December 2008. All diagnostic, treatment, and disposition decisions were made by myself in conjunction with the advanced practice provider.     For all further details of the patient's emergency department visit, please see the advanced practice provider's hemorrhage, mass effect or midline shift. No abnormal extra-axial fluid collection. The gray-white differentiation is maintained without evidence of an acute infarct. There is no evidence of hydrocephalus. Moderate generalized volume loss. Scattered periventricular low-attenuation likely related to chronic small vessel disease. ORBITS: Global SINUSES: The visualized paranasal sinuses and mastoid air cells demonstrate no acute abnormality. SOFT TISSUES/SKULL:  Intracranial vascular calcifications. No acute intracranial abnormality. Xr Chest Portable    Result Date: 9/4/2019  EXAMINATION: ONE XRAY VIEW OF THE CHEST 9/4/2019 1:54 pm COMPARISON: August 15, 2013 HISTORY: ORDERING SYSTEM PROVIDED HISTORY: SOB TECHNOLOGIST PROVIDED HISTORY: Reason for exam:->SOB Reason for Exam: Fall. Left ankle and right hip pain Acuity: Acute Type of Exam: Initial FINDINGS: Left pacemaker. Small left lung base opacity. Moderate right lung base opacity. Sternotomy. Cardiomegaly. Mild pulmonary edema. Mild pulmonary edema. Moderate right lung base opacity may represent atelectasis and pleural fluid with pneumonia not excluded. Final diagnoses:   Elevated troponin   Fall, initial encounter       Comment: Please note this report has been produced using speech recognition software and may contain errors related to that system including errors in grammar, punctuation, and spelling, as well as words and phrases that may be inappropriate. If there are any questions or concerns please feel free to contact the dictating provider for clarification.       Jevon Arenas DO  09/04/19 6734

## 2019-09-06 LAB
ANION GAP SERPL CALCULATED.3IONS-SCNC: 9 MMOL/L (ref 3–16)
APTT: 39 SEC (ref 26–36)
BUN BLDV-MCNC: 29 MG/DL (ref 7–20)
CALCIUM SERPL-MCNC: 9 MG/DL (ref 8.3–10.6)
CHLORIDE BLD-SCNC: 106 MMOL/L (ref 99–110)
CO2: 27 MMOL/L (ref 21–32)
CREAT SERPL-MCNC: 1.4 MG/DL (ref 0.8–1.3)
GFR AFRICAN AMERICAN: 59
GFR NON-AFRICAN AMERICAN: 49
GLUCOSE BLD-MCNC: 134 MG/DL (ref 70–99)
GLUCOSE BLD-MCNC: 140 MG/DL (ref 70–99)
GLUCOSE BLD-MCNC: 149 MG/DL (ref 70–99)
GLUCOSE BLD-MCNC: 190 MG/DL (ref 70–99)
GLUCOSE BLD-MCNC: 197 MG/DL (ref 70–99)
HCT VFR BLD CALC: 29.6 % (ref 40.5–52.5)
HEMOGLOBIN: 9.3 G/DL (ref 13.5–17.5)
MAGNESIUM: 2.2 MG/DL (ref 1.8–2.4)
MCH RBC QN AUTO: 25.6 PG (ref 26–34)
MCHC RBC AUTO-ENTMCNC: 31.3 G/DL (ref 31–36)
MCV RBC AUTO: 81.9 FL (ref 80–100)
OSMOLALITY URINE: 550 MOSM/KG (ref 390–1070)
PDW BLD-RTO: 20.2 % (ref 12.4–15.4)
PERFORMED ON: ABNORMAL
PHOSPHORUS: 4.1 MG/DL (ref 2.5–4.9)
PLATELET # BLD: 133 K/UL (ref 135–450)
PMV BLD AUTO: 7.7 FL (ref 5–10.5)
POTASSIUM SERPL-SCNC: 4.6 MMOL/L (ref 3.5–5.1)
RBC # BLD: 3.62 M/UL (ref 4.2–5.9)
SODIUM BLD-SCNC: 142 MMOL/L (ref 136–145)
WBC # BLD: 3.8 K/UL (ref 4–11)

## 2019-09-06 PROCEDURE — 2700000000 HC OXYGEN THERAPY PER DAY

## 2019-09-06 PROCEDURE — 82570 ASSAY OF URINE CREATININE: CPT

## 2019-09-06 PROCEDURE — 84540 ASSAY OF URINE/UREA-N: CPT

## 2019-09-06 PROCEDURE — 97530 THERAPEUTIC ACTIVITIES: CPT

## 2019-09-06 PROCEDURE — 6370000000 HC RX 637 (ALT 250 FOR IP): Performed by: INTERNAL MEDICINE

## 2019-09-06 PROCEDURE — 2060000000 HC ICU INTERMEDIATE R&B

## 2019-09-06 PROCEDURE — 84100 ASSAY OF PHOSPHORUS: CPT

## 2019-09-06 PROCEDURE — 94640 AIRWAY INHALATION TREATMENT: CPT

## 2019-09-06 PROCEDURE — 6360000002 HC RX W HCPCS: Performed by: INTERNAL MEDICINE

## 2019-09-06 PROCEDURE — 2580000003 HC RX 258: Performed by: INTERNAL MEDICINE

## 2019-09-06 PROCEDURE — 80048 BASIC METABOLIC PNL TOTAL CA: CPT

## 2019-09-06 PROCEDURE — 36415 COLL VENOUS BLD VENIPUNCTURE: CPT

## 2019-09-06 PROCEDURE — 6360000002 HC RX W HCPCS: Performed by: HOSPITALIST

## 2019-09-06 PROCEDURE — 83735 ASSAY OF MAGNESIUM: CPT

## 2019-09-06 PROCEDURE — 6370000000 HC RX 637 (ALT 250 FOR IP): Performed by: HOSPITALIST

## 2019-09-06 PROCEDURE — 84156 ASSAY OF PROTEIN URINE: CPT

## 2019-09-06 PROCEDURE — 83935 ASSAY OF URINE OSMOLALITY: CPT

## 2019-09-06 PROCEDURE — 99233 SBSQ HOSP IP/OBS HIGH 50: CPT | Performed by: INTERNAL MEDICINE

## 2019-09-06 PROCEDURE — 97162 PT EVAL MOD COMPLEX 30 MIN: CPT

## 2019-09-06 PROCEDURE — 97166 OT EVAL MOD COMPLEX 45 MIN: CPT

## 2019-09-06 PROCEDURE — 94761 N-INVAS EAR/PLS OXIMETRY MLT: CPT

## 2019-09-06 PROCEDURE — 84300 ASSAY OF URINE SODIUM: CPT

## 2019-09-06 PROCEDURE — 85730 THROMBOPLASTIN TIME PARTIAL: CPT

## 2019-09-06 PROCEDURE — 85027 COMPLETE CBC AUTOMATED: CPT

## 2019-09-06 RX ORDER — FUROSEMIDE 10 MG/ML
40 INJECTION INTRAMUSCULAR; INTRAVENOUS 2 TIMES DAILY
Status: DISCONTINUED | OUTPATIENT
Start: 2019-09-06 | End: 2019-09-07

## 2019-09-06 RX ORDER — LOSARTAN POTASSIUM 25 MG/1
50 TABLET ORAL NIGHTLY
Status: DISCONTINUED | OUTPATIENT
Start: 2019-09-06 | End: 2019-09-10 | Stop reason: HOSPADM

## 2019-09-06 RX ADMIN — VITAMIN D, TAB 1000IU (100/BT) 1000 UNITS: 25 TAB at 09:55

## 2019-09-06 RX ADMIN — INSULIN LISPRO 1 UNITS: 100 INJECTION, SOLUTION INTRAVENOUS; SUBCUTANEOUS at 18:25

## 2019-09-06 RX ADMIN — IPRATROPIUM BROMIDE AND ALBUTEROL SULFATE 1 AMPULE: .5; 3 SOLUTION RESPIRATORY (INHALATION) at 07:12

## 2019-09-06 RX ADMIN — LOSARTAN POTASSIUM 50 MG: 25 TABLET ORAL at 20:01

## 2019-09-06 RX ADMIN — ASPIRIN 81 MG 81 MG: 81 TABLET ORAL at 09:55

## 2019-09-06 RX ADMIN — HYDROCODONE BITARTRATE AND ACETAMINOPHEN 1 TABLET: 5; 325 TABLET ORAL at 01:31

## 2019-09-06 RX ADMIN — Medication 10 ML: at 09:57

## 2019-09-06 RX ADMIN — DESMOPRESSIN ACETATE 40 MG: 0.2 TABLET ORAL at 20:01

## 2019-09-06 RX ADMIN — IPRATROPIUM BROMIDE AND ALBUTEROL SULFATE 1 AMPULE: .5; 3 SOLUTION RESPIRATORY (INHALATION) at 11:15

## 2019-09-06 RX ADMIN — RIVAROXABAN 15 MG: 15 TABLET, FILM COATED ORAL at 10:01

## 2019-09-06 RX ADMIN — AMOXICILLIN 500 MG: 250 CAPSULE ORAL at 09:55

## 2019-09-06 RX ADMIN — FUROSEMIDE 40 MG: 10 INJECTION, SOLUTION INTRAMUSCULAR; INTRAVENOUS at 18:25

## 2019-09-06 RX ADMIN — IPRATROPIUM BROMIDE AND ALBUTEROL SULFATE 1 AMPULE: .5; 3 SOLUTION RESPIRATORY (INHALATION) at 15:04

## 2019-09-06 RX ADMIN — Medication 10 ML: at 20:02

## 2019-09-06 RX ADMIN — ATENOLOL 25 MG: 25 TABLET ORAL at 20:01

## 2019-09-06 RX ADMIN — FUROSEMIDE 20 MG: 10 INJECTION, SOLUTION INTRAMUSCULAR; INTRAVENOUS at 09:55

## 2019-09-06 RX ADMIN — ATENOLOL 25 MG: 25 TABLET ORAL at 09:55

## 2019-09-06 RX ADMIN — Medication 10 ML: at 18:25

## 2019-09-06 RX ADMIN — TAMSULOSIN HYDROCHLORIDE 0.4 MG: 0.4 CAPSULE ORAL at 09:55

## 2019-09-06 RX ADMIN — IPRATROPIUM BROMIDE AND ALBUTEROL SULFATE 1 AMPULE: .5; 3 SOLUTION RESPIRATORY (INHALATION) at 17:59

## 2019-09-06 RX ADMIN — AMIODARONE HYDROCHLORIDE 200 MG: 200 TABLET ORAL at 09:55

## 2019-09-06 RX ADMIN — INSULIN LISPRO 1 UNITS: 100 INJECTION, SOLUTION INTRAVENOUS; SUBCUTANEOUS at 21:18

## 2019-09-06 RX ADMIN — AMOXICILLIN 500 MG: 250 CAPSULE ORAL at 20:01

## 2019-09-06 ASSESSMENT — PAIN SCALES - GENERAL
PAINLEVEL_OUTOF10: 0
PAINLEVEL_OUTOF10: 7

## 2019-09-06 ASSESSMENT — PAIN DESCRIPTION - LOCATION: LOCATION_2: FOOT

## 2019-09-06 ASSESSMENT — PAIN DESCRIPTION - ORIENTATION: ORIENTATION_2: LEFT

## 2019-09-06 NOTE — PLAN OF CARE
Problem: Falls - Risk of:  Goal: Will remain free from falls  Description  Will remain free from falls  Outcome: Ongoing  Goal: Absence of physical injury  Description  Absence of physical injury  Outcome: Ongoing     Problem: Risk for Impaired Skin Integrity  Goal: Tissue integrity - skin and mucous membranes  Description  Structural intactness and normal physiological function of skin and  mucous membranes. Outcome: Ongoing  Turning patient Q2, changing brief PRN, patient is not incontinent but occasionally spills urinal=aware of his own needs and will call out. Problem: Pain:  Description  Pain management should include both nonpharmacologic and pharmacologic interventions.   Goal: Pain level will decrease  Description  Pain level will decrease  Outcome: Ongoing  Goal: Control of acute pain  Description  Control of acute pain  Outcome: Ongoing  Goal: Control of chronic pain  Description  Control of chronic pain  Outcome: Ongoing

## 2019-09-06 NOTE — DISCHARGE INSTR - COC
JOINT REPLACEMENT      LEFT       Immunization History: There is no immunization history on file for this patient. Active Problems:  Patient Active Problem List   Diagnosis Code    Diabetes mellitus ([facility] [address] E11.9    Hyperlipidemia E78.5    Hypertension I10    CAD (coronary artery disease) I25.10    Bradycardia R00.1    Cardiac pacemaker in situ Z95.0    Fitting and adjustment of cardiac pacemaker Z38.26    Fall W19. Jaja Pean Elevated troponin R74.8       Isolation/Infection:   Isolation          No Isolation            Nurse Assessment:  Last Vital Signs: /77   Pulse 69   Temp 98.1 °F (36.7 °C) (Oral)   Resp 18   Ht 6' 2\" (1.88 m)   Wt 261 lb 1.6 oz (118.4 kg)   SpO2 96%   BMI 33.52 kg/m²     Last documented pain score (0-10 scale): Pain Level: 0  Last Weight:   Wt Readings from Last 1 Encounters:   09/06/19 261 lb 1.6 oz (118.4 kg)     Mental Status:  oriented, alert and forgetful    IV Access:  - None    Nursing Mobility/ADLs:  Walking   Assisted  Transfer  Assisted  Bathing  Dependent  Dressing  Dependent  Toileting  Assisted  Feeding  Independent  Med Admin  Assisted  Med Delivery   whole    Wound Care Documentation and Therapy:        Elimination:  Continence:   · Bowel: Yes  · Bladder: Yes  Urinary Catheter: None   Colostomy/Ileostomy/Ileal Conduit: No       Date of Last BM: 9/5/2019    Intake/Output Summary (Last 24 hours) at 9/6/2019 1244  Last data filed at 9/6/2019 0827  Gross per 24 hour   Intake 480 ml   Output 1150 ml   Net -670 ml     I/O last 3 completed shifts: In: 1020 [P.O.:1020]  Out: 1150 [Urine:1150]    Safety Concerns:     History of Falls (last 30 days) and At Risk for Falls    Impairments/Disabilities:      Vision and Hearing    Nutrition Therapy:  Current Nutrition Therapy:   - Oral Diet:  Carb Control 4 carbs/meal (1800kcals/day) and Low Sodium (3-4gm)    Routes of Feeding: Oral  Liquids:  Thin Liquids  Daily Fluid Restriction: yes - amount 2000 ml  Last Modified Barium Swallow with Video (Video Swallowing Test): not done    Treatments at the Time of Hospital Discharge:   Respiratory Treatments: N/A  Oxygen Therapy:  is on oxygen at 2 L/min per nasal cannula. Ventilator:    - No ventilator support    Rehab Therapies: Physical Therapy and Occupational Therapy  Weight Bearing Status/Restrictions: No weightbearing restrictions  Other Medical Equipment (for information only, NOT a DME order):  walker  Other Treatments: Bilateral unna boots, left walking boot, sacral mepilex for prevention, bilateral elbow mepilex d/t skin tears    Patient's personal belongings (please select all that are sent with patient):  None    RN SIGNATURE:  Electronically signed by Cheryl Harrison RN on 9/10/19 at 3:35 PM    CASE MANAGEMENT/SOCIAL WORK SECTION    Inpatient Status Date: 9/4/19    Readmission Risk Assessment Score:  Readmission Risk              Risk of Unplanned Readmission:        19           Discharging to Facility/ Agency   [facility]: [facility]  Phone: [phone]  Fax: [phone]      / signature: Wili De La Fuente [facility], [facility]  [phone]      PHYSICIAN SECTION    Prognosis: Fair    Condition at Discharge: Stable    Rehab Potential (if transferring to Rehab): Fair    Recommended Labs or Other Treatments After Discharge: repeat BMP in a couple days. Outpatient folllow up with Nephrology         Physician Certification: I certify the above information and transfer of Duane Cousins  is necessary for the continuing treatment of the diagnosis listed and that he requires [facility] for less 30 days.      Update Admission H&P: No change in H&P    PHYSICIAN SIGNATURE:  Electronically signed by Ha Piedra MD on 9/10/19 at 3:13 PM

## 2019-09-06 NOTE — PROGRESS NOTES
Physical Therapy    Facility/Department: [address]  Initial Assessment    NAME: Graciela Arthur  : 1940  MRN: [id number]    Date of Service: 2019    Discharge Recommendations:Hemet DILIP Moreno scored a  on the AM-PAC short mobility form. Current research shows that an AM-PAC score of 17 or less is typically not associated with a discharge to the patient's home setting. Based on the patients AM-PAC score and their current functional mobility deficits, it is recommended that the patient have 3-5 sessions per week of Physical Therapy at d/c to increase the patients independence. PT Equipment Recommendations  Equipment Needed: No    Assessment   Body structures, Functions, Activity limitations: Decreased functional mobility ; Decreased ADL status; Decreased ROM; Decreased strength;Decreased safe awareness;Decreased endurance;Decreased balance;Decreased sensation; Increased Pain  Assessment: Pt prsents as below his baseline function, s/p fall and prolonged time left down. Pt demonstrates the above functional limitations and would benefit from skilled PT services to promote safe return to PLOF. At this time, pt does not appear safe to discharge to home environment secondary to overall limited mobility this date and lack of consistent safe assistance at home. Prognosis: Good;Fair  Decision Making: Medium Complexity  PT Education: PT Role;Plan of Care;Precautions;General Safety; Functional Mobility Training;Transfer Training  Barriers to Learning: Emotional  REQUIRES PT FOLLOW UP: Yes  Activity Tolerance  Activity Tolerance: Patient limited by fatigue;Patient limited by endurance; Patient limited by pain       Patient Diagnosis(es): The primary encounter diagnosis was Elevated troponin. A diagnosis of Fall, initial encounter was also pertinent to this visit. has a past medical history of CAD (coronary artery disease), Diabetes mellitus ([address] Hyperlipidemia, and Hypertension.    has a past surgical history that includes Coronary artery bypass graft; joint replacement; and Carpal tunnel release. Restrictions  Restrictions/Precautions  Restrictions/Precautions: Fall Risk(High)  Required Braces or Orthoses?: No  Position Activity Restriction  Other position/activity restrictions: Harpreet Guerrero is a 78 y.o. male presents to the emergency department after falling this morning. The patient reports that he had to void, got up to use a urinal at 6. States that he went back to bed because his wife and not awaken. Reports that he needed to void again at 8 but did not have a urinal available upstairs, got on the stair lift to go down the stairs and the stair lift chair failed, states that he \"bailed out\", he was on the ground from 8:00 this morning until about 1300 today. It is unclear how he was found. Patient is complaining of pain to the left tib-fib and left hip. Denies other injury. Does not believe that he hit his head, denies loss of consciousness. xray negative for acute fx  Vision/Hearing  Vision: Impaired  Vision Exceptions: Wears glasses for reading  Hearing: Within functional limits     Subjective  General  Chart Reviewed: Yes  Response To Previous Treatment: Not applicable  Family / Caregiver Present: No  Diagnosis: Fall  Follows Commands: Within Functional Limits  General Comment  Comments: Pt in semi segovia's position in bed.   Subjective  Subjective: Pt agreeable to PT/OT kellee.  Pain Screening  Patient Currently in Pain: Denies  Vital Signs  Patient Currently in Pain: Denies       Orientation  Orientation  Overall Orientation Status: Within Functional Limits  Social/Functional History  Social/Functional History  Lives With: Spouse(per chart review, pt reports spouse has mild dementia)  Type of Home: House  Home Layout: Multi-level, Laundry in basement, Bed/Bath upstairs(Stair Lift between 1st and 2nd floors (often fails between floors); wife completes laundry; bath on 3rd floor)  Home Access:

## 2019-09-06 NOTE — PROGRESS NOTES
[facility] PROGRESS NOTE    9/6/2019 1:24 PM        Name: Gerard Alonso . Admitted: 9/4/2019  Primary Care Provider: Kylah House MD (Tel: [phone])                        Hospital course:  Eladio Cook a 78 y. o. male is a known case of coronary artery disease status post CABG status post pacemaker he had a mechanical injury to the left leg and he developed pain and could not walk.  He came to the ER x-ray of the leg was done no evidence of fracture patient had elevated troponin at  0.04 and elevated proBNP patient was admitted for further evaluation.       Subjective: Patient sitting up in a chair, wants to go home Reviewed interval ancillary notes    Current Medications    amoxicillin (AMOXIL) capsule 500 mg BID   amiodarone (CORDARONE) tablet 200 mg Daily   tamsulosin (FLOMAX) capsule 0.4 mg Daily   rivaroxaban (XARELTO) tablet 15 mg Daily with breakfast   ipratropium-albuterol (DUONEB) nebulizer solution 1 ampule Q4H WA   furosemide (LASIX) injection 20 mg BID   atenolol (TENORMIN) tablet 25 mg BID   atorvastatin (LIPITOR) tablet 40 mg Nightly   losartan (COZAAR) tablet 100 mg Nightly   vitamin D (CHOLECALCIFEROL) tablet 1,000 Units Daily   sodium chloride flush 0.9 % injection 10 mL 2 times per day   sodium chloride flush 0.9 % injection 10 mL PRN   magnesium hydroxide (MILK OF MAGNESIA) 400 MG/5ML suspension 30 mL Daily PRN   ondansetron (ZOFRAN) injection 4 mg Q6H PRN   perflutren lipid microspheres (DEFINITY) injection 1.65 mg ONCE PRN   insulin lispro (HUMALOG) injection pen 0-6 Units TID WC   insulin lispro (HUMALOG) injection pen 0-3 Units Nightly   aspirin chewable tablet 81 mg Daily   HYDROcodone-acetaminophen (NORCO) 5-325 MG per tablet 1 tablet Q6H PRN   glucose (GLUTOSE) 40 % oral gel 15 g PRN   dextrose 50 % IV solution PRN   glucagon (rDNA) injection 1.Mechanical injury to the left leg no evidence of fracture.   Physical and occupational therapy consulted   2. Patient looks like in fluid overload exam showed anasarca around belly,  thighs and lower legs he has anasarca, albumin and globulin level looks like normal, echocardiogram showed left ventricle ejection fraction 35% with global hypokinesis, flattening of interventricular septum suggestive of right ventricular pressure and volume overload . Continue with strict intake and output and daily weights, continue Lasix   3. Elevated troponin  0.04, Hx of CAD+ status post CABG+ status post pacemaker. cardiology consulted, stable ischemic heart disease, troponin elevation likely secondary to mild congestion, stop heparin drip   4. Creatinine level went up with a acute kidney injury, we will consult nephrology   5. Blood pressure stable continue atenolol  6. Type 2 diabetes mellitus, morning blood sugar 134 mg/dL, continue sliding scale insulin  7.   Paroxysmal atrial fibrillation, continue Xarelto rate controlled.     Diet: DIET CARB CONTROL; Carb Control: 4 carb choices (60 gms)/meal; Daily Fluid Restriction: 2000 ml  Code:Full Code  DVT PPX lovenox       Mackenzie Toney MD   9/6/2019 1:24 PM

## 2019-09-06 NOTE — DISCHARGE SUMMARY
The [place] Sleepiness Scale       The [place] Sleepiness Scale is widely used in the field of sleep medicine as a subjective measure of a patient's sleepiness. The test is a list of eight situations in which you rate your tendency to become sleepy on a scale of 0, no chance to 3, high chance of dozing. Your score is based on a scale of 0 to 24. The scale estimates whether you are experiencing excessive sleepiness that possibly requires medical attention. How Sleepy Are You? How sleepy are you to doze off or fall asleep in the following situations? You should rate your chances of dozing off, not just feeling tired. Even if you have not done some of these things recently try to determine how they would have affected you.  For each situation, decide whether or not you would have:     0 = No chance of dozing 1 = Slight chance of dozing   2 = Moderate chance of  dozing 3 = High change of dozing       Situation                                                                                     Chance of Dozing    Sitting and reading  0 =  []  1 =    [] 2 =    [x] 3 =    []    Watching TV  0 =  []  1 =    [] 2 =    [] 3 =    [x]      Sitting inactive in public place (e.g., a theater or a meeting)  0 =  []  1 =    [] 2 =    [x] 3 =    []    As a passenger in a car for an hour without a break          0  =  []  1 =    [x] 2 =    [] 3 =    []    Lying down to rest in the afternoon when circumstances permit    0 =  []  1 =    [] 2 =    [x] 3 =    []    Sitting and talking to someone  0 =  []  1 =    [] 2 =    [x] 3 =    []      Sitting quietly after a lunch without alcohol  0 =  []  1 =    [] 2 =    [] 3 =    [x]    In a car, while stopped for a few minutes in traffic                                                                      0 =  []  1 =    [x] 2 =    [] 3 =    []    Total Score = 16    If your total score is 10 or greater, you are experiencing excessive sleepiness and should consider seeking a medical follow-up. Take a copy of this screening test to your primary care physician on your next office visit. Interpretation:      0 -   7: It is unlikely that you are abnormally sleepy. 8 -   9: You have an average amount of daytime sleepiness. 10 - 15: You may be excessively sleepy depending on the situation. You may want to consider seeking medical attention. 16 - 24: You are excessively sleepy and should consider seeking medical attention.       Electronically signed by Libertad Raymond RCP on 9/6/2019 at 2:22 PM

## 2019-09-06 NOTE — CARE COORDINATION
Met with patient to provide a list of facilities in the [address] [place] [address]. Referrals initiated to [facility].

## 2019-09-06 NOTE — CONSULTS
weights    PATIENT/CAREGIVER TEACHING:    Level of patient/caregiver understanding able to:   [ ] Verbalize understanding [ ] Demonstrate understanding [ ] Teach back   [x ] Needs reinforcement [ ] Other:       Time spent teachin mins    1. WEIGHT: Admit Weight: 240 lb (108.9 kg)      Today  Weight: 261 lb 1.6 oz (118.4 kg)   2. I/O     Intake/Output Summary (Last 24 hours) at 2019 1233  Last data filed at 2019 0827  Gross per 24 hour   Intake 480 ml   Output 1150 ml   Net -670 ml       Recommendations:   1. Encourage to attend Heartwise class on  at 1400 if still inpatient. 2. Educate further on fluid restriction 48 oz- 64 oz during inpatient stay so he can understand how to measure intake at home. 3. Continue to educate on S/S.   4. Emphasize daily weights, diet, and knowing when and who to call  5. Provided patient with CHF Resource Line for questions and concerns.   6. CHf pathway on elen for discharge to snf       BONILLA LORENZO 2019 12:33 PM

## 2019-09-06 NOTE — PROGRESS NOTES
Patient had an episode of confusion, he was sleeping and woke up yelling out for his wife to help him to the restroom. Once RN got to the room he reoriented and laughed. Due to confusion RN checked BS. It was 192       Patient had a LARGE brown soft BM in bedside commode. Required 2 person assist. RN placed a Lift pad under him in hopes to get him moved to a LIFT room. Patient got back to bed and fell asleep. RT entered room and patient O2 sats low 86% on RA. Placed on 3L per NC. When patient awake O2 Sats are higher, will continue O2 over night. Patient denies sleep apnea. Patient is on bed alarm. Call light within reach. Will continue to monitor.

## 2019-09-07 LAB
ANION GAP SERPL CALCULATED.3IONS-SCNC: 10 MMOL/L (ref 3–16)
BUN BLDV-MCNC: 30 MG/DL (ref 7–20)
CALCIUM SERPL-MCNC: 8.9 MG/DL (ref 8.3–10.6)
CHLORIDE BLD-SCNC: 103 MMOL/L (ref 99–110)
CO2: 27 MMOL/L (ref 21–32)
CREAT SERPL-MCNC: 1.3 MG/DL (ref 0.8–1.3)
CREATININE URINE: 58 MG/DL (ref 39–259)
GFR AFRICAN AMERICAN: >60
GFR NON-AFRICAN AMERICAN: 53
GLUCOSE BLD-MCNC: 145 MG/DL (ref 70–99)
GLUCOSE BLD-MCNC: 157 MG/DL (ref 70–99)
GLUCOSE BLD-MCNC: 162 MG/DL (ref 70–99)
GLUCOSE BLD-MCNC: 179 MG/DL (ref 70–99)
GLUCOSE BLD-MCNC: 207 MG/DL (ref 70–99)
PERFORMED ON: ABNORMAL
POTASSIUM SERPL-SCNC: 4.3 MMOL/L (ref 3.5–5.1)
PROTEIN PROTEIN: 10 MG/DL
SODIUM BLD-SCNC: 140 MMOL/L (ref 136–145)
SODIUM URINE: 133 MMOL/L
TROPONIN: 0.03 NG/ML
UREA NITROGEN, UR: 556.7 MG/DL (ref 800–1666)

## 2019-09-07 PROCEDURE — 6370000000 HC RX 637 (ALT 250 FOR IP): Performed by: INTERNAL MEDICINE

## 2019-09-07 PROCEDURE — 80048 BASIC METABOLIC PNL TOTAL CA: CPT

## 2019-09-07 PROCEDURE — 94761 N-INVAS EAR/PLS OXIMETRY MLT: CPT

## 2019-09-07 PROCEDURE — 2580000003 HC RX 258: Performed by: INTERNAL MEDICINE

## 2019-09-07 PROCEDURE — 84484 ASSAY OF TROPONIN QUANT: CPT

## 2019-09-07 PROCEDURE — 2700000000 HC OXYGEN THERAPY PER DAY

## 2019-09-07 PROCEDURE — 36415 COLL VENOUS BLD VENIPUNCTURE: CPT

## 2019-09-07 PROCEDURE — 6370000000 HC RX 637 (ALT 250 FOR IP): Performed by: HOSPITALIST

## 2019-09-07 PROCEDURE — 2060000000 HC ICU INTERMEDIATE R&B

## 2019-09-07 PROCEDURE — 94640 AIRWAY INHALATION TREATMENT: CPT

## 2019-09-07 RX ORDER — FUROSEMIDE 40 MG/1
40 TABLET ORAL 2 TIMES DAILY
Status: DISCONTINUED | OUTPATIENT
Start: 2019-09-07 | End: 2019-09-10

## 2019-09-07 RX ADMIN — AMOXICILLIN 500 MG: 250 CAPSULE ORAL at 12:40

## 2019-09-07 RX ADMIN — ASPIRIN 81 MG 81 MG: 81 TABLET ORAL at 12:40

## 2019-09-07 RX ADMIN — AMOXICILLIN 500 MG: 250 CAPSULE ORAL at 21:34

## 2019-09-07 RX ADMIN — AMIODARONE HYDROCHLORIDE 200 MG: 200 TABLET ORAL at 12:40

## 2019-09-07 RX ADMIN — FUROSEMIDE 40 MG: 40 TABLET ORAL at 17:05

## 2019-09-07 RX ADMIN — VITAMIN D, TAB 1000IU (100/BT) 1000 UNITS: 25 TAB at 12:40

## 2019-09-07 RX ADMIN — HYDROCODONE BITARTRATE AND ACETAMINOPHEN 1 TABLET: 5; 325 TABLET ORAL at 12:40

## 2019-09-07 RX ADMIN — ATENOLOL 25 MG: 25 TABLET ORAL at 12:40

## 2019-09-07 RX ADMIN — IPRATROPIUM BROMIDE AND ALBUTEROL SULFATE 1 AMPULE: .5; 3 SOLUTION RESPIRATORY (INHALATION) at 16:40

## 2019-09-07 RX ADMIN — IPRATROPIUM BROMIDE AND ALBUTEROL SULFATE 1 AMPULE: .5; 3 SOLUTION RESPIRATORY (INHALATION) at 13:00

## 2019-09-07 RX ADMIN — ATENOLOL 25 MG: 25 TABLET ORAL at 21:35

## 2019-09-07 RX ADMIN — FUROSEMIDE 40 MG: 40 TABLET ORAL at 12:40

## 2019-09-07 RX ADMIN — TAMSULOSIN HYDROCHLORIDE 0.4 MG: 0.4 CAPSULE ORAL at 12:40

## 2019-09-07 RX ADMIN — LOSARTAN POTASSIUM 50 MG: 25 TABLET ORAL at 21:34

## 2019-09-07 RX ADMIN — IPRATROPIUM BROMIDE AND ALBUTEROL SULFATE 1 AMPULE: .5; 3 SOLUTION RESPIRATORY (INHALATION) at 20:12

## 2019-09-07 RX ADMIN — RIVAROXABAN 15 MG: 15 TABLET, FILM COATED ORAL at 17:05

## 2019-09-07 RX ADMIN — DESMOPRESSIN ACETATE 40 MG: 0.2 TABLET ORAL at 21:35

## 2019-09-07 RX ADMIN — INSULIN LISPRO 2 UNITS: 100 INJECTION, SOLUTION INTRAVENOUS; SUBCUTANEOUS at 17:05

## 2019-09-07 RX ADMIN — HYDROCODONE BITARTRATE AND ACETAMINOPHEN 1 TABLET: 5; 325 TABLET ORAL at 03:45

## 2019-09-07 RX ADMIN — INSULIN LISPRO 1 UNITS: 100 INJECTION, SOLUTION INTRAVENOUS; SUBCUTANEOUS at 20:07

## 2019-09-07 RX ADMIN — Medication 10 ML: at 21:35

## 2019-09-07 ASSESSMENT — PAIN SCALES - GENERAL
PAINLEVEL_OUTOF10: 6
PAINLEVEL_OUTOF10: 0
PAINLEVEL_OUTOF10: 0
PAINLEVEL_OUTOF10: 10
PAINLEVEL_OUTOF10: 0
PAINLEVEL_OUTOF10: 0

## 2019-09-07 ASSESSMENT — PAIN DESCRIPTION - LOCATION: LOCATION: HEAD

## 2019-09-07 ASSESSMENT — PAIN DESCRIPTION - PAIN TYPE: TYPE: ACUTE PAIN

## 2019-09-07 NOTE — PROGRESS NOTES
therapy consulted   2.  Patient looks like in fluid overload exam showed anasarca around belly,  thighs and lower legs he has anasarca, albumin and globulin level looks like normal, echocardiogram showed left ventricle ejection fraction 35% with global hypokinesis, flattening of interventricular septum suggestive of right ventricular pressure and volume overload .  Continue with strict intake and output and daily weights,  currently -1.4 L of fluid balance ,continue Lasix   3. Elevated troponin  0.04, Hx of CAD+ status post CABG+ status post pacemaker. cardiology consulted, stable ischemic heart disease, troponin elevation likely secondary to mild congestion, stop heparin drip   4.    Creatinine level went up with a acute kidney injury, the patient refused  blood draw this morning case discussed with nephrology patient currently refusing intravenous Lasix and wants to go home, in this situation, patient is not ready to go home from my standpoint,  needs close follow-up if patient leave [address] and needs  to be on Lasix by mouth daily.   5.  Blood pressure stable continue atenolol  6.  Type 2 diabetes mellitus, morning blood sugar 134 mg/dL, continue sliding scale insulin  7.  Paroxysmal atrial fibrillation, continue Xarelto rate controlled.     Diet: DIET CARB CONTROL; Carb Control: 4 carb choices (60 gms)/meal; Daily Fluid Restriction: 2000 ml  Code:Full Code  DVT PPX lovenox       Patricio Campa MD   9/7/2019 11:18 AM

## 2019-09-07 NOTE — CONSULTS
[facility]                                                                                                                       Office : [phone]     Fax :[phone]       Nephrology Consult Note      Patient's Name: Paresh Womack  9:56 AM  9/7/2019    Reason for Consult:  MARIA DOLORES, volume overload      Requesting Physician:  Edson So MD      Chief Complaint:    Chief Complaint   Patient presents with   [employer] Fall     pt brought in by [place] [facility], pt states he tripped over his feet this am and fell, c/o right hip and left ankle pain. Pt denies hitting his head         History of Present Ilness:    Paresh Womack is a 78 y.o. male with h/o Dm2, CAD, CABG, HFrEF who was admitted after a fall. No fracture. Had elevated troponin and edema. ECHO showed reduced EF of 35 %   Global hypokinesia     Since admitted has gradually worsening creatinine . Has edema     Past Medical History:   Diagnosis Date    CAD (coronary artery disease)     Diabetes mellitus ([address]     Hyperlipidemia     Hypertension        Past Surgical History:   Procedure Laterality Date    CARPAL TUNNEL RELEASE      CORONARY ARTERY BYPASS GRAFT      JOINT REPLACEMENT      LEFT       No family history on file. reports that he has never smoked. He has never used smokeless tobacco. He reports that he does not drink alcohol or use drugs. Allergies:  Patient has no known allergies.     Current Medications:      furosemide (LASIX) injection 40 mg BID   losartan (COZAAR) tablet 50 mg Nightly   amoxicillin (AMOXIL) capsule 500 mg BID   amiodarone (CORDARONE) tablet 200 mg Daily   tamsulosin (FLOMAX) capsule 0.4 mg Daily   rivaroxaban (XARELTO) tablet 15 mg Daily with breakfast   ipratropium-albuterol (DUONEB) nebulizer solution 1 ampule Monitor while on lasix     Per RN lost iv access this morning   Give lasix PO in meanwhile     D/w Dr. Gabby Navarro              Thank you for allowing us to participate in care of Marcos Pollock         Electronically signed by: Bere Arenas MD, 9/7/2019, 9:56 AM      [facility]  [address]  Office : [phone]  Fax :[phone]

## 2019-09-08 LAB
APTT: 37.4 SEC (ref 26–36)
APTT: 38.5 SEC (ref 26–36)
APTT: 40.8 SEC (ref 26–36)
APTT: 41.6 SEC (ref 26–36)
FERRITIN: 106.1 NG/ML (ref 30–400)
GLUCOSE BLD-MCNC: 159 MG/DL (ref 70–99)
GLUCOSE BLD-MCNC: 171 MG/DL (ref 70–99)
GLUCOSE BLD-MCNC: 189 MG/DL (ref 70–99)
GLUCOSE BLD-MCNC: 214 MG/DL (ref 70–99)
IRON SATURATION: 4 % (ref 20–50)
IRON: 14 UG/DL (ref 59–158)
PERFORMED ON: ABNORMAL
PLATELET # BLD: 130 K/UL (ref 135–450)
TOTAL IRON BINDING CAPACITY: 319 UG/DL (ref 260–445)
TROPONIN: 0.03 NG/ML

## 2019-09-08 PROCEDURE — 6370000000 HC RX 637 (ALT 250 FOR IP): Performed by: INTERNAL MEDICINE

## 2019-09-08 PROCEDURE — 36415 COLL VENOUS BLD VENIPUNCTURE: CPT

## 2019-09-08 PROCEDURE — 82728 ASSAY OF FERRITIN: CPT

## 2019-09-08 PROCEDURE — 94640 AIRWAY INHALATION TREATMENT: CPT

## 2019-09-08 PROCEDURE — 94760 N-INVAS EAR/PLS OXIMETRY 1: CPT

## 2019-09-08 PROCEDURE — 2700000000 HC OXYGEN THERAPY PER DAY

## 2019-09-08 PROCEDURE — 85730 THROMBOPLASTIN TIME PARTIAL: CPT

## 2019-09-08 PROCEDURE — 2580000003 HC RX 258: Performed by: INTERNAL MEDICINE

## 2019-09-08 PROCEDURE — 94761 N-INVAS EAR/PLS OXIMETRY MLT: CPT

## 2019-09-08 PROCEDURE — 6370000000 HC RX 637 (ALT 250 FOR IP): Performed by: HOSPITALIST

## 2019-09-08 PROCEDURE — 84484 ASSAY OF TROPONIN QUANT: CPT

## 2019-09-08 PROCEDURE — 83540 ASSAY OF IRON: CPT

## 2019-09-08 PROCEDURE — 85049 AUTOMATED PLATELET COUNT: CPT

## 2019-09-08 PROCEDURE — 2060000000 HC ICU INTERMEDIATE R&B

## 2019-09-08 PROCEDURE — 83550 IRON BINDING TEST: CPT

## 2019-09-08 RX ADMIN — FUROSEMIDE 40 MG: 40 TABLET ORAL at 08:35

## 2019-09-08 RX ADMIN — VITAMIN D, TAB 1000IU (100/BT) 1000 UNITS: 25 TAB at 08:35

## 2019-09-08 RX ADMIN — DESMOPRESSIN ACETATE 40 MG: 0.2 TABLET ORAL at 22:23

## 2019-09-08 RX ADMIN — IPRATROPIUM BROMIDE AND ALBUTEROL SULFATE 1 AMPULE: .5; 3 SOLUTION RESPIRATORY (INHALATION) at 09:02

## 2019-09-08 RX ADMIN — INSULIN LISPRO 1 UNITS: 100 INJECTION, SOLUTION INTRAVENOUS; SUBCUTANEOUS at 22:23

## 2019-09-08 RX ADMIN — HYDROCODONE BITARTRATE AND ACETAMINOPHEN 1 TABLET: 5; 325 TABLET ORAL at 00:15

## 2019-09-08 RX ADMIN — TAMSULOSIN HYDROCHLORIDE 0.4 MG: 0.4 CAPSULE ORAL at 08:35

## 2019-09-08 RX ADMIN — ASPIRIN 81 MG 81 MG: 81 TABLET ORAL at 08:35

## 2019-09-08 RX ADMIN — ATENOLOL 25 MG: 25 TABLET ORAL at 08:35

## 2019-09-08 RX ADMIN — INSULIN LISPRO 2 UNITS: 100 INJECTION, SOLUTION INTRAVENOUS; SUBCUTANEOUS at 14:10

## 2019-09-08 RX ADMIN — AMOXICILLIN 500 MG: 250 CAPSULE ORAL at 08:35

## 2019-09-08 RX ADMIN — IPRATROPIUM BROMIDE AND ALBUTEROL SULFATE 1 AMPULE: .5; 3 SOLUTION RESPIRATORY (INHALATION) at 19:24

## 2019-09-08 RX ADMIN — RIVAROXABAN 15 MG: 15 TABLET, FILM COATED ORAL at 08:35

## 2019-09-08 RX ADMIN — AMOXICILLIN 500 MG: 250 CAPSULE ORAL at 22:23

## 2019-09-08 RX ADMIN — HYDROCODONE BITARTRATE AND ACETAMINOPHEN 1 TABLET: 5; 325 TABLET ORAL at 16:30

## 2019-09-08 RX ADMIN — Medication 10 ML: at 22:23

## 2019-09-08 RX ADMIN — FUROSEMIDE 40 MG: 40 TABLET ORAL at 17:40

## 2019-09-08 RX ADMIN — LOSARTAN POTASSIUM 50 MG: 25 TABLET ORAL at 22:23

## 2019-09-08 RX ADMIN — IPRATROPIUM BROMIDE AND ALBUTEROL SULFATE 1 AMPULE: .5; 3 SOLUTION RESPIRATORY (INHALATION) at 12:46

## 2019-09-08 RX ADMIN — IPRATROPIUM BROMIDE AND ALBUTEROL SULFATE 1 AMPULE: .5; 3 SOLUTION RESPIRATORY (INHALATION) at 16:05

## 2019-09-08 RX ADMIN — AMIODARONE HYDROCHLORIDE 200 MG: 200 TABLET ORAL at 08:35

## 2019-09-08 RX ADMIN — INSULIN LISPRO 1 UNITS: 100 INJECTION, SOLUTION INTRAVENOUS; SUBCUTANEOUS at 08:35

## 2019-09-08 RX ADMIN — INSULIN LISPRO 1 UNITS: 100 INJECTION, SOLUTION INTRAVENOUS; SUBCUTANEOUS at 17:40

## 2019-09-08 RX ADMIN — ATENOLOL 25 MG: 25 TABLET ORAL at 22:23

## 2019-09-08 ASSESSMENT — PAIN SCALES - GENERAL
PAINLEVEL_OUTOF10: 1
PAINLEVEL_OUTOF10: 0
PAINLEVEL_OUTOF10: 0
PAINLEVEL_OUTOF10: 3
PAINLEVEL_OUTOF10: 0
PAINLEVEL_OUTOF10: 7
PAINLEVEL_OUTOF10: 0
PAINLEVEL_OUTOF10: 0

## 2019-09-08 ASSESSMENT — PAIN DESCRIPTION - LOCATION: LOCATION: GENERALIZED

## 2019-09-08 NOTE — PROGRESS NOTES
[facility] PROGRESS NOTE    9/8/2019 10:04 AM        Name: Petty Garcia . Admitted: 9/4/2019  Primary Care Provider: Boaz Fraga MD (Tel: [phone])                        Hospital course:  Benja Red a 78 y. o. male is a known case of coronary artery disease status post CABG status post pacemaker he had a mechanical injury to the left leg and he developed pain and could not walk.  He came to the ER x-ray of the leg was done no evidence of fracture patient had elevated troponin at  0.04 and elevated proBNP patient was admitted for further evaluation. Subjective:  . No acute events overnight. Resting well. Pain control. Diet ok. Labs reviewed  Denies any chest pain sob.      Reviewed interval ancillary notes    Current Medications    furosemide (LASIX) tablet 40 mg BID   losartan (COZAAR) tablet 50 mg Nightly   amoxicillin (AMOXIL) capsule 500 mg BID   amiodarone (CORDARONE) tablet 200 mg Daily   tamsulosin (FLOMAX) capsule 0.4 mg Daily   rivaroxaban (XARELTO) tablet 15 mg Daily with breakfast   ipratropium-albuterol (DUONEB) nebulizer solution 1 ampule Q4H WA   atenolol (TENORMIN) tablet 25 mg BID   atorvastatin (LIPITOR) tablet 40 mg Nightly   vitamin D (CHOLECALCIFEROL) tablet 1,000 Units Daily   sodium chloride flush 0.9 % injection 10 mL 2 times per day   sodium chloride flush 0.9 % injection 10 mL PRN   magnesium hydroxide (MILK OF MAGNESIA) 400 MG/5ML suspension 30 mL Daily PRN   ondansetron (ZOFRAN) injection 4 mg Q6H PRN   perflutren lipid microspheres (DEFINITY) injection 1.65 mg ONCE PRN   insulin lispro (HUMALOG) injection pen 0-6 Units TID WC   insulin lispro (HUMALOG) injection pen 0-3 Units Nightly   aspirin chewable tablet 81 mg Daily   HYDROcodone-acetaminophen (NORCO) 5-325 MG per tablet 1 tablet Q6H PRN   glucose (GLUTOSE) 40 % oral gel 15 g PRN   dextrose 50 % IV solution PRN   glucagon (rDNA) injection 1 mg PRN   dextrose 5 % solution PRN       Objective:  /79   Pulse 70   Temp 98 °F (36.7 °C) (Oral)   Resp 18   Ht 6' 2\" (1.88 m)   Wt 243 lb 4.8 oz (110.4 kg)   SpO2 92%   BMI 31.24 kg/m²     Intake/Output Summary (Last 24 hours) at 9/8/2019 1004  Last data filed at 9/8/2019 0901  Gross per 24 hour   Intake 120 ml   Output 1750 ml   Net -1630 ml    Wt Readings from Last 3 Encounters:   09/08/19 243 lb 4.8 oz (110.4 kg)   07/10/14 259 lb (117.5 kg)   06/17/14 268 lb (121.6 kg)       General appearance:  Appears comfortable  Eyes: Sclera clear. Pupils equal.  ENT: Moist oral mucosa. Trachea midline, no adenopathy. Cardiovascular: Regular rhythm, normal S1, S2. No murmur. No edema in lower extremities  Respiratory: Not using accessory muscles. Good inspiratory effort. Clear to auscultation bilaterally, no wheeze or crackles. GI: Abdomen soft, no tenderness, not distended, normal bowel sounds  Musculoskeletal: No cyanosis in digits, neck supple  Neurology: CN 2-12 grossly intact. No speech or motor deficits  Psych: Normal affect. Alert and oriented in time, place and person  Skin: Warm, dry, normal turgor    Labs and Tests:  CBC:   Recent Labs     09/06/19  0620 09/08/19  0616   WBC 3.8*  --    HGB 9.3*  --    * 130*     BMP:  Recent Labs     09/06/19  0620 09/07/19  1613    140   K 4.6 4.3    103   CO2 27 27   BUN 29* 30*   CREATININE 1.4* 1.3   GLUCOSE 140* 179*         Problem List  Active Problems:    Fall    Elevated troponin  Resolved Problems:    * No resolved hospital problems.  *       Assessment & Plan:   1.Mechanical injury to the left leg no evidence of fracture.   Physical and occupational therapy consulted   2.  Patient looks like in fluid overload exam showed anasarca around belly,  thighs and lower legs he has anasarca, albumin and globulin level looks like normal, echocardiogram showed left ventricle

## 2019-09-08 NOTE — PROGRESS NOTES
[facility]                                                                                                                       Office : [phone]     Fax :[phone]       Nephrology  Note        Chief Complaint:    Chief Complaint   Patient presents with    Fall     pt brought in by [facility], pt states he tripped over his feet this am and fell, c/o right hip and left ankle pain. Pt denies hitting his head         History of Present Ilness:    Denilson Jansen is a 78 y.o. male with h/o Dm2, CAD, CABG, HFrEF who was admitted after a fall. No fracture. Had elevated troponin and edema. ECHO showed reduced EF of 35 %   Global hypokinesia     Since admitted has gradually worsening creatinine . Has edema     Interval hx     Edema decreased   Renal function stable   I/O last 3 completed shifts:   In: 120 [P.O.:120]  Out: 1675 [Urine:1675]  I/O this shift:  In: -   Out: 200 [Urine:200]        Past Medical History:   Diagnosis Date    CAD (coronary artery disease)     Diabetes mellitus ([facility] [address]     Hyperlipidemia     Hypertension          Current Medications:      furosemide (LASIX) tablet 40 mg BID   losartan (COZAAR) tablet 50 mg Nightly   amoxicillin (AMOXIL) capsule 500 mg BID   amiodarone (CORDARONE) tablet 200 mg Daily   tamsulosin (FLOMAX) capsule 0.4 mg Daily   rivaroxaban (XARELTO) tablet 15 mg Daily with breakfast   ipratropium-albuterol (DUONEB) nebulizer solution 1 ampule Q4H WA   atenolol (TENORMIN) tablet 25 mg BID   atorvastatin (LIPITOR) tablet 40 mg Nightly   vitamin D (CHOLECALCIFEROL) tablet 1,000 Units Daily   sodium chloride flush 0.9 % injection 10 mL 2 times per day   sodium chloride flush 0.9 % injection 10 mL PRN   magnesium hydroxide (MILK OF MAGNESIA) 400 MG/5ML suspension 30 mL Daily PRN   ondansetron (ZOFRAN) injection 4 mg Q6H PRN   perflutren lipid microspheres (DEFINITY) injection 1.65 mg ONCE PRN   insulin lispro (HUMALOG) injection pen 0-6 Units TID WC   insulin lispro (HUMALOG) injection pen 0-3 Units Nightly   aspirin chewable tablet 81 mg Daily   HYDROcodone-acetaminophen (NORCO) 5-325 MG per tablet 1 tablet Q6H PRN   glucose (GLUTOSE) 40 % oral gel 15 g PRN   dextrose 50 % IV solution PRN   glucagon (rDNA) injection 1 mg PRN   dextrose 5 % solution PRN       Physical exam:     Vitals:  /79   Pulse 70   Temp 98 °F (36.7 °C) (Oral)   Resp 18   Ht 6' 2\" (1.88 m)   Wt 243 lb 4.8 oz (110.4 kg)   SpO2 92%   BMI 31.24 kg/m²   Constitutional:  OAA X3 NAD  Skin: no rash, turgor wnl  Heent:  eomi, mmm  Neck: no bruits or jvd noted  Cardiovascular:  S1, S2 without m/r/g  Respiratory: CTA B without w/r/r  Abdomen:  +bs, soft, nt, nd  Ext: b/ L lower extremity edema  Psychiatric: mood and affect appropriate  Musculoskeletal:  Rom, muscular strength intact    Labs:  CBC:   Recent Labs     09/06/19  0620 09/08/19  0616   WBC 3.8*  --    HGB 9.3*  --    * 130*     BMP:    Recent Labs     09/06/19  0620 09/07/19  1613    140   K 4.6 4.3    103   CO2 27 27   BUN 29* 30*   CREATININE 1.4* 1.3   GLUCOSE 140* 179*     Ca/Mg/Phos:   Recent Labs     09/06/19  0620 09/07/19  1613   CALCIUM 9.0 8.9   MG 2.20  --    PHOS 4.1  --      Hepatic: No results for input(s): AST, ALT, ALB, BILITOT, ALKPHOS in the last 72 hours. Troponin:   Recent Labs     09/05/19  1812 09/07/19  1613 09/08/19  0616   TROPONINI 0.04* 0.03* 0.03*     BNP: No results for input(s): BNP in the last 72 hours. Lipids: No results for input(s): CHOL, TRIG, HDL, LDLCALC, LABVLDL in the last 72 hours. ABGs: No results for input(s): PHART, PO2ART, RIQ6NIY in the last 72 hours. INR:   No results for input(s): INR in the last 72 hours.   UA:  No results for input(s): Blossom Martins, [facility], [address] [place] [address], [place], BLOODU, PHUR, PROTEINU, UROBILINOGEN, NITRU, LEUKOCYTESUR, Lin Quintin in the last 72 hours. Urine Microscopic: No results for input(s): LABCAST, BACTERIA, COMU, HYALCAST, WBCUA, RBCUA, EPIU in the last 72 hours. Urine Culture: No results for input(s): LABURIN in the last 72 hours. Urine Chemistry:   Recent Labs     09/06/19  1830   LABCREA 58.0   PROTEINUR 10.00   NAUR 133                IMAGING:  XR CHEST PORTABLE   Final Result   Mild pulmonary edema. Moderate right lung base opacity may represent atelectasis and pleural fluid   with pneumonia not excluded. XR TIBIA FIBULA LEFT (2 VIEWS)   Final Result   Osteopenia. No fracture. Vascular calcifications. XR HIP RIGHT (2-3 VIEWS)   Final Result   No acute bone or joint abnormality. CT Head WO Contrast   Final Result   No acute intracranial abnormality. I/O last 3 completed shifts: In: 120 [P.O.:120]  Out: 1675 [Urine:1675]          Assessment/Plan :      1.  MARIA DOLORES likely CRS  Will get urine studies   Has volume overload state   Continue lasix PO  Decreased dose of losartan to 50 mg po daily     2. HTN. Controlled   3. Anemia. Check iron studies     4. Acid- base disorder. Metabolic alkalosis . Monitor     5. Electrolytes.  Monitor while on lasix       D/w Dr. Wesly Rasmussen              Thank you for allowing us to participate in care of Rodríguez Tannerde         Electronically signed by: Chance Clarke MD, 9/8/2019, 9:24 AM      [facility] [address]  Office : [phone]  Fax :[phone]

## 2019-09-09 LAB
ANION GAP SERPL CALCULATED.3IONS-SCNC: 11 MMOL/L (ref 3–16)
BILIRUBIN URINE: NEGATIVE
BLOOD, URINE: NEGATIVE
BUN BLDV-MCNC: 34 MG/DL (ref 7–20)
CALCIUM SERPL-MCNC: 8.9 MG/DL (ref 8.3–10.6)
CHLORIDE BLD-SCNC: 99 MMOL/L (ref 99–110)
CLARITY: CLEAR
CO2: 27 MMOL/L (ref 21–32)
COLOR: YELLOW
CREAT SERPL-MCNC: 1.4 MG/DL (ref 0.8–1.3)
GFR AFRICAN AMERICAN: 59
GFR NON-AFRICAN AMERICAN: 49
GLUCOSE BLD-MCNC: 146 MG/DL (ref 70–99)
GLUCOSE BLD-MCNC: 166 MG/DL (ref 70–99)
GLUCOSE BLD-MCNC: 175 MG/DL (ref 70–99)
GLUCOSE BLD-MCNC: 194 MG/DL (ref 70–99)
GLUCOSE BLD-MCNC: 194 MG/DL (ref 70–99)
GLUCOSE URINE: NEGATIVE MG/DL
KETONES, URINE: NEGATIVE MG/DL
LEUKOCYTE ESTERASE, URINE: NEGATIVE
MICROSCOPIC EXAMINATION: NORMAL
NITRITE, URINE: NEGATIVE
PERFORMED ON: ABNORMAL
PH UA: 6 (ref 5–8)
POTASSIUM SERPL-SCNC: 4.2 MMOL/L (ref 3.5–5.1)
PROTEIN UA: NEGATIVE MG/DL
SODIUM BLD-SCNC: 137 MMOL/L (ref 136–145)
SPECIFIC GRAVITY UA: 1.01 (ref 1–1.03)
URINE TYPE: NORMAL
UROBILINOGEN, URINE: 1 E.U./DL

## 2019-09-09 PROCEDURE — 2580000003 HC RX 258: Performed by: INTERNAL MEDICINE

## 2019-09-09 PROCEDURE — 97530 THERAPEUTIC ACTIVITIES: CPT

## 2019-09-09 PROCEDURE — 94761 N-INVAS EAR/PLS OXIMETRY MLT: CPT

## 2019-09-09 PROCEDURE — 6370000000 HC RX 637 (ALT 250 FOR IP): Performed by: HOSPITALIST

## 2019-09-09 PROCEDURE — 6360000002 HC RX W HCPCS: Performed by: INTERNAL MEDICINE

## 2019-09-09 PROCEDURE — 94640 AIRWAY INHALATION TREATMENT: CPT

## 2019-09-09 PROCEDURE — 36415 COLL VENOUS BLD VENIPUNCTURE: CPT

## 2019-09-09 PROCEDURE — 2060000000 HC ICU INTERMEDIATE R&B

## 2019-09-09 PROCEDURE — 97535 SELF CARE MNGMENT TRAINING: CPT

## 2019-09-09 PROCEDURE — 6370000000 HC RX 637 (ALT 250 FOR IP): Performed by: INTERNAL MEDICINE

## 2019-09-09 PROCEDURE — 81003 URINALYSIS AUTO W/O SCOPE: CPT

## 2019-09-09 PROCEDURE — 80048 BASIC METABOLIC PNL TOTAL CA: CPT

## 2019-09-09 PROCEDURE — 6370000000 HC RX 637 (ALT 250 FOR IP): Performed by: PHYSICIAN ASSISTANT

## 2019-09-09 RX ORDER — LANOLIN ALCOHOL/MO/W.PET/CERES
3 CREAM (GRAM) TOPICAL NIGHTLY
Status: DISCONTINUED | OUTPATIENT
Start: 2019-09-09 | End: 2019-09-10 | Stop reason: HOSPADM

## 2019-09-09 RX ADMIN — DESMOPRESSIN ACETATE 40 MG: 0.2 TABLET ORAL at 22:23

## 2019-09-09 RX ADMIN — ATENOLOL 25 MG: 25 TABLET ORAL at 22:23

## 2019-09-09 RX ADMIN — IPRATROPIUM BROMIDE AND ALBUTEROL SULFATE 1 AMPULE: .5; 3 SOLUTION RESPIRATORY (INHALATION) at 07:37

## 2019-09-09 RX ADMIN — MELATONIN TAB 3 MG 3 MG: 3 TAB at 22:23

## 2019-09-09 RX ADMIN — FUROSEMIDE 40 MG: 40 TABLET ORAL at 17:52

## 2019-09-09 RX ADMIN — RIVAROXABAN 15 MG: 15 TABLET, FILM COATED ORAL at 10:14

## 2019-09-09 RX ADMIN — Medication 10 ML: at 22:28

## 2019-09-09 RX ADMIN — IPRATROPIUM BROMIDE AND ALBUTEROL SULFATE 1 AMPULE: .5; 3 SOLUTION RESPIRATORY (INHALATION) at 21:23

## 2019-09-09 RX ADMIN — TAMSULOSIN HYDROCHLORIDE 0.4 MG: 0.4 CAPSULE ORAL at 09:17

## 2019-09-09 RX ADMIN — AMIODARONE HYDROCHLORIDE 200 MG: 200 TABLET ORAL at 09:16

## 2019-09-09 RX ADMIN — IPRATROPIUM BROMIDE AND ALBUTEROL SULFATE 1 AMPULE: .5; 3 SOLUTION RESPIRATORY (INHALATION) at 12:16

## 2019-09-09 RX ADMIN — INSULIN LISPRO 1 UNITS: 100 INJECTION, SOLUTION INTRAVENOUS; SUBCUTANEOUS at 22:43

## 2019-09-09 RX ADMIN — ASPIRIN 81 MG 81 MG: 81 TABLET ORAL at 09:16

## 2019-09-09 RX ADMIN — INSULIN LISPRO 1 UNITS: 100 INJECTION, SOLUTION INTRAVENOUS; SUBCUTANEOUS at 17:52

## 2019-09-09 RX ADMIN — IPRATROPIUM BROMIDE AND ALBUTEROL SULFATE 1 AMPULE: .5; 3 SOLUTION RESPIRATORY (INHALATION) at 15:52

## 2019-09-09 RX ADMIN — FUROSEMIDE 40 MG: 40 TABLET ORAL at 09:17

## 2019-09-09 RX ADMIN — INSULIN LISPRO 1 UNITS: 100 INJECTION, SOLUTION INTRAVENOUS; SUBCUTANEOUS at 11:55

## 2019-09-09 RX ADMIN — INSULIN LISPRO 1 UNITS: 100 INJECTION, SOLUTION INTRAVENOUS; SUBCUTANEOUS at 09:22

## 2019-09-09 RX ADMIN — HYDROCODONE BITARTRATE AND ACETAMINOPHEN 1 TABLET: 5; 325 TABLET ORAL at 05:56

## 2019-09-09 RX ADMIN — VITAMIN D, TAB 1000IU (100/BT) 1000 UNITS: 25 TAB at 09:16

## 2019-09-09 RX ADMIN — AMOXICILLIN 500 MG: 250 CAPSULE ORAL at 22:25

## 2019-09-09 RX ADMIN — IRON SUCROSE 200 MG: 20 INJECTION, SOLUTION INTRAVENOUS at 08:58

## 2019-09-09 RX ADMIN — LOSARTAN POTASSIUM 50 MG: 25 TABLET ORAL at 22:23

## 2019-09-09 RX ADMIN — ATENOLOL 25 MG: 25 TABLET ORAL at 09:17

## 2019-09-09 RX ADMIN — AMOXICILLIN 500 MG: 250 CAPSULE ORAL at 09:16

## 2019-09-09 ASSESSMENT — PAIN SCALES - GENERAL
PAINLEVEL_OUTOF10: 0
PAINLEVEL_OUTOF10: 6
PAINLEVEL_OUTOF10: 0
PAINLEVEL_OUTOF10: 0

## 2019-09-09 ASSESSMENT — PAIN DESCRIPTION - PAIN TYPE: TYPE: ACUTE PAIN

## 2019-09-09 ASSESSMENT — PAIN DESCRIPTION - LOCATION: LOCATION: GENERALIZED

## 2019-09-09 NOTE — PROGRESS NOTES
[facility] PROGRESS NOTE    9/9/2019 5:48 PM        Name: Paulino Mayorga . Admitted: 9/4/2019  Primary Care Provider: Vernon Damon MD (Tel: [phone])                        Hospital course:  Elidia Boyer a 78 y. o. male is a known case of coronary artery disease status post CABG status post pacemaker he had a mechanical injury to the left leg and he developed pain and could not walk.  He came to the ER x-ray of the leg was done no evidence of fracture patient had elevated troponin at  0.04 and elevated proBNP patient was admitted for further evaluation. Long discussion today with patient and family in regards to him going to a skilled facility. Patient has been resistant to care and does not want to go to a facility deemed to have a response or come back for any suggestions. Subjective:      No acute events overnight. Resting well. Pain control. Diet ok. Labs reviewed  Denies any chest pain sob.    Appears to have some mild baseline dementia    Reviewed interval ancillary notes    Current Medications    iron sucrose (VENOFER) 200 mg in sodium chloride 0.9 % 100 mL IVPB Q24H   furosemide (LASIX) tablet 40 mg BID   losartan (COZAAR) tablet 50 mg Nightly   amoxicillin (AMOXIL) capsule 500 mg BID   amiodarone (CORDARONE) tablet 200 mg Daily   tamsulosin (FLOMAX) capsule 0.4 mg Daily   rivaroxaban (XARELTO) tablet 15 mg Daily with breakfast   ipratropium-albuterol (DUONEB) nebulizer solution 1 ampule Q4H WA   atenolol (TENORMIN) tablet 25 mg BID   atorvastatin (LIPITOR) tablet 40 mg Nightly   vitamin D (CHOLECALCIFEROL) tablet 1,000 Units Daily   sodium chloride flush 0.9 % injection 10 mL 2 times per day   sodium chloride flush 0.9 % injection 10 mL PRN   magnesium hydroxide (MILK OF MAGNESIA) 400 MG/5ML suspension 30 mL Daily PRN   ondansetron (ZOFRAN) injection 4 mg Q6H PRN   perflutren lipid microspheres (DEFINITY) injection 1.65 mg ONCE PRN   insulin lispro (HUMALOG) injection pen 0-6 Units TID WC   insulin lispro (HUMALOG) injection pen 0-3 Units Nightly   aspirin chewable tablet 81 mg Daily   HYDROcodone-acetaminophen (NORCO) 5-325 MG per tablet 1 tablet Q6H PRN   glucose (GLUTOSE) 40 % oral gel 15 g PRN   dextrose 50 % IV solution PRN   glucagon (rDNA) injection 1 mg PRN   dextrose 5 % solution PRN       Objective:  /70   Pulse 70   Temp 98.1 °F (36.7 °C) (Oral)   Resp 18   Ht 6' 2\" (1.88 m)   Wt 253 lb 11.2 oz (115.1 kg)   SpO2 94%   BMI 32.57 kg/m²     Intake/Output Summary (Last 24 hours) at 9/9/2019 1748  Last data filed at 9/9/2019 1744  Gross per 24 hour   Intake 960 ml   Output 2500 ml   Net -1540 ml      Wt Readings from Last 3 Encounters:   09/09/19 253 lb 11.2 oz (115.1 kg)   07/10/14 259 lb (117.5 kg)   06/17/14 268 lb (121.6 kg)       General appearance:  Appears comfortable  Eyes: Sclera clear. Pupils equal.  ENT: Moist oral mucosa. Trachea midline, no adenopathy. Cardiovascular: Regular rhythm, normal S1, S2. No murmur. No edema in lower extremities  Respiratory: Not using accessory muscles. Good inspiratory effort. Clear to auscultation bilaterally, no wheeze or crackles. GI: Abdomen soft, no tenderness, not distended, normal bowel sounds  Musculoskeletal: No cyanosis in digits, neck supple  Neurology: CN 2-12 grossly intact. No speech or motor deficits  Psych: Normal affect. Alert and oriented in time, place and person  Skin: Warm, dry, normal turgor    Labs and Tests:  CBC:   Recent Labs     09/08/19  0616   *     BMP:    Recent Labs     09/07/19  1613 09/09/19  0849    137   K 4.3 4.2    99   CO2 27 27   BUN 30* 34*   CREATININE 1.3 1.4*   GLUCOSE 179* 175*         Problem List  Active Problems:    Fall    Elevated troponin  Resolved Problems:    * No resolved hospital problems.  *       Assessment &

## 2019-09-09 NOTE — PROGRESS NOTES
times per day   sodium chloride flush 0.9 % injection 10 mL PRN   magnesium hydroxide (MILK OF MAGNESIA) 400 MG/5ML suspension 30 mL Daily PRN   ondansetron (ZOFRAN) injection 4 mg Q6H PRN   perflutren lipid microspheres (DEFINITY) injection 1.65 mg ONCE PRN   insulin lispro (HUMALOG) injection pen 0-6 Units TID WC   insulin lispro (HUMALOG) injection pen 0-3 Units Nightly   aspirin chewable tablet 81 mg Daily   HYDROcodone-acetaminophen (NORCO) 5-325 MG per tablet 1 tablet Q6H PRN   glucose (GLUTOSE) 40 % oral gel 15 g PRN   dextrose 50 % IV solution PRN   glucagon (rDNA) injection 1 mg PRN   dextrose 5 % solution PRN       Physical exam:     Vitals:  /67   Pulse 72   Temp 98.1 °F (36.7 °C) (Oral)   Resp 18   Ht 6' 2\" (1.88 m)   Wt 253 lb 11.2 oz (115.1 kg)   SpO2 92%   BMI 32.57 kg/m²   Constitutional:  OAA X3 NAD  Skin: no rash, turgor wnl  Heent:  eomi, mmm  Neck: no bruits or jvd noted  Cardiovascular:  S1, S2 without m/r/g  Respiratory: CTA B without w/r/r  Abdomen:  +bs, soft, nt, nd  Ext: b/ L lower extremity edema  Psychiatric: mood and affect appropriate  Musculoskeletal:  Rom, muscular strength intact    Labs:  CBC:   Recent Labs     09/08/19  0616   *     BMP:    Recent Labs     09/07/19  1613 09/09/19  0849    137   K 4.3 4.2    99   CO2 27 27   BUN 30* 34*   CREATININE 1.3 1.4*   GLUCOSE 179* 175*     Ca/Mg/Phos:   Recent Labs     09/07/19  1613 09/09/19  0849   CALCIUM 8.9 8.9     Hepatic: No results for input(s): AST, ALT, ALB, BILITOT, ALKPHOS in the last 72 hours. Troponin:   Recent Labs     09/07/19  1613 09/08/19  0616   TROPONINI 0.03* 0.03*     BNP: No results for input(s): BNP in the last 72 hours. Lipids: No results for input(s): CHOL, TRIG, HDL, LDLCALC, LABVLDL in the last 72 hours. ABGs: No results for input(s): PHART, PO2ART, [id number] in the last 72 hours. INR:   No results for input(s): INR in the last 72 hours.   UA:  No results for input(s): COLORU,

## 2019-09-09 NOTE — PROGRESS NOTES
Physical Therapy  Facility/Department: [address]  Daily Treatment Note  NAME: Graciela Arthur  : 1940  MRN: [id number]    Date of Service: 2019    Discharge Recommendations:  Graciela Arthur scored a  on the AM-PAC short mobility form. Current research shows that an AM-PAC score of 17 or less is typically not associated with a discharge to the patient's home setting. Based on the patients AM-PAC score and their current functional mobility deficits, it is recommended that the patient have 3-5 sessions per week of Physical Therapy at d/c to increase the patients independence. PT Equipment Recommendations  Equipment Needed: No    Assessment   Body structures, Functions, Activity limitations: Decreased functional mobility ; Decreased ADL status; Decreased ROM; Decreased strength;Decreased safe awareness;Decreased endurance;Decreased balance;Decreased sensation; Increased Pain  Assessment: Continues to present below his baseline. 2 person assistance for STS transfers and very unsafe with pivot transfer bed to chair with RW. Prognosis: Fair  PT Education: PT Role;Plan of Care;Precautions;General Safety; Functional Mobility Training;Transfer Training  Barriers to Learning: agitation  REQUIRES PT FOLLOW UP: Yes  Activity Tolerance  Activity Tolerance: Patient Tolerated treatment well  Activity Tolerance: slightly agitated but with encouragement agreed to participate in some activity      Patient Diagnosis(es): The primary encounter diagnosis was Elevated troponin. A diagnosis of Fall, initial encounter was also pertinent to this visit. has a past medical history of CAD (coronary artery disease), Diabetes mellitus ([facility] [address] Hyperlipidemia, and Hypertension. has a past surgical history that includes Coronary artery bypass graft; joint replacement; and Carpal tunnel release.     Restrictions  Restrictions/Precautions  Restrictions/Precautions: Fall Risk(High)  Required Braces or Orthoses?:

## 2019-09-09 NOTE — PROGRESS NOTES
Dr. Gabby Jiang, [facility] w/ social work, patients daughter Kayy Davis at bedside. Plan of care discussed w/ patient and patient's family. Patient seems agreeable as for now. Will await PT/OT eval and treat.

## 2019-09-09 NOTE — PROGRESS NOTES
This RN received call from police, stated patient believes he is being held against his will. This RN to patient bedside to explain why patient is here and what plan is for discharge at this time. Patient is argumentative and believes that the hospital is \"lying about the fluid in my lungs. \" Patient informed that he came in for a fall and was staying for diuresis related to fluid overload until he and his wife receive placement at [facility]. Patient states he will allow IV placement to begin IV iron replacement but repeats that he does not \"believe the doctors about the fluid in my lungs. \"

## 2019-09-09 NOTE — PROGRESS NOTES
Received call from police department. Patient called 911 and reported he was being held against his will. Went into patient's room, patient still on phone with police department. Told patient he is not being held against his will and he is being treated for fall and fluid overload. Patient getting agitated.

## 2019-09-09 NOTE — CARE COORDINATION
GENA spoke with Marisela at [facility] and she reported that they cannnot accept pt, unless behavior improves. SW to follow.     Hank Sierra MSW, [facility]  [phone]'

## 2019-09-09 NOTE — PROGRESS NOTES
recommendation, safety awareness, importance of working with therapy, benefits of being up in the chair. Barriers to Learning: cognition and safety awareness  REQUIRES OT FOLLOW UP: Yes  Activity Tolerance  Activity Tolerance: Patient Tolerated treatment well  Activity Tolerance: . Safety Devices  Safety Devices in place: Yes  Type of devices: All fall risk precautions in place; Patient at risk for falls;Call light within reach; Left in chair;Chair alarm in place;Nurse notified(Wife present)  Restraints  Initially in place: No         Patient Diagnosis(es): The primary encounter diagnosis was Elevated troponin. A diagnosis of Fall, initial encounter was also pertinent to this visit. has a past medical history of CAD (coronary artery disease), Diabetes mellitus ([address] Hyperlipidemia, and Hypertension. has a past surgical history that includes Coronary artery bypass graft; joint replacement; and Carpal tunnel release. Restrictions  Restrictions/Precautions  Restrictions/Precautions: Fall Risk(High fall risk)  Required Braces or Orthoses?: No  Position Activity Restriction  Other position/activity restrictions: Edward Vaughan is a 78 y.o. male presents to the emergency department after falling this morning. The patient reports that he had to void, got up to use a urinal at 6. States that he went back to bed because his wife and not awaken. Reports that he needed to void again at 8 but did not have a urinal available upstairs, got on the stair lift to go down the stairs and the stair lift chair failed, states that he \"bailed out\", he was on the ground from 8:00 this morning until about 1300 today. It is unclear how he was found. Patient is complaining of pain to the left tib-fib and left hip. Denies other injury. Does not believe that he hit his head, denies loss of consciousness.  xray negative for acute fx  Subjective   General  Chart Reviewed: Yes  Family / Caregiver Present: Yes(Wife, Agnes Fuelling)  Diagnosis: fall   Subjective  Subjective: Pt supine in bed upon arrival, agreeable to OT/PT cotx with encouragement. Pre Treatment Pain Screening  Intervention List: Patient able to continue with treatment  Vital Signs  Patient Currently in Pain: Denies   Orientation  Orientation  Overall Orientation Status: Within Functional Limits  Objective    ADL  Grooming: Setup;Supervision(brush teeth, wash face, comb hair seated in recliner. OT assisted pt in washing his hair with pt massaging shampoo cap for 1 min, OT for 1 min.)  Additional Comments: Recommended wife bring in electric razor, as pt's beard getting long for hand razors. Boot for Charcot foot present, but not used during session. Balance  Sitting Balance: Stand by assistance  Standing Balance: Dependent/Total(Mod A of 2 w/RW)  Standing Balance  Time: ~20 sec  Activity: stand pivot transfer EOB to recliner  Comment: Assist of 2, pt's L knee buckling. Boot not used for transfer. Functional Mobility  Functional Mobility Comments: unsafe to assess this date  Bed mobility  Supine to Sit: Minimal assistance(HOB elevated, assistance of LLE off EOB, plus requested HHA, pt also used bed railing)  Scooting: Stand by assistance(to EOB to get feet to touch ground)  Transfers  Sit to stand: 2 Person assistance;Dependent/Total(Mod A of 2 from elevated bed)  Stand to sit: 2 Person assistance;Dependent/Total(Mod A of 2 to control descent into recliner; pt followed cue to place R hand on bed; decreased eccentric control.)                       Cognition  Overall Cognitive Status: Exceptions  Arousal/Alertness: Appropriate responses to stimuli  Following Commands:  Follows one step commands consistently  Attention Span: Appears intact  Memory: (inconsistent information occassionally given)  Safety Judgement: Decreased awareness of need for safety  Problem Solving: Decreased awareness of errors;Assistance required to identify errors made;Assistance required to

## 2019-09-10 VITALS
BODY MASS INDEX: 32.66 KG/M2 | WEIGHT: 254.5 LBS | DIASTOLIC BLOOD PRESSURE: 71 MMHG | HEIGHT: 74 IN | TEMPERATURE: 97.9 F | HEART RATE: 70 BPM | SYSTOLIC BLOOD PRESSURE: 118 MMHG | RESPIRATION RATE: 18 BRPM | OXYGEN SATURATION: 95 %

## 2019-09-10 LAB
ANION GAP SERPL CALCULATED.3IONS-SCNC: 8 MMOL/L (ref 3–16)
BUN BLDV-MCNC: 33 MG/DL (ref 7–20)
CALCIUM SERPL-MCNC: 8.9 MG/DL (ref 8.3–10.6)
CHLORIDE BLD-SCNC: 100 MMOL/L (ref 99–110)
CO2: 30 MMOL/L (ref 21–32)
CREAT SERPL-MCNC: 1.6 MG/DL (ref 0.8–1.3)
GFR AFRICAN AMERICAN: 50
GFR NON-AFRICAN AMERICAN: 42
GLUCOSE BLD-MCNC: 155 MG/DL (ref 70–99)
GLUCOSE BLD-MCNC: 159 MG/DL (ref 70–99)
GLUCOSE BLD-MCNC: 163 MG/DL (ref 70–99)
GLUCOSE BLD-MCNC: 167 MG/DL (ref 70–99)
PERFORMED ON: ABNORMAL
PLATELET # BLD: 148 K/UL (ref 135–450)
POTASSIUM SERPL-SCNC: 4.1 MMOL/L (ref 3.5–5.1)
SODIUM BLD-SCNC: 138 MMOL/L (ref 136–145)

## 2019-09-10 PROCEDURE — 94640 AIRWAY INHALATION TREATMENT: CPT

## 2019-09-10 PROCEDURE — 97535 SELF CARE MNGMENT TRAINING: CPT

## 2019-09-10 PROCEDURE — 6370000000 HC RX 637 (ALT 250 FOR IP): Performed by: INTERNAL MEDICINE

## 2019-09-10 PROCEDURE — 85049 AUTOMATED PLATELET COUNT: CPT

## 2019-09-10 PROCEDURE — 97530 THERAPEUTIC ACTIVITIES: CPT

## 2019-09-10 PROCEDURE — 6370000000 HC RX 637 (ALT 250 FOR IP): Performed by: HOSPITALIST

## 2019-09-10 PROCEDURE — 80048 BASIC METABOLIC PNL TOTAL CA: CPT

## 2019-09-10 PROCEDURE — 2580000003 HC RX 258: Performed by: INTERNAL MEDICINE

## 2019-09-10 PROCEDURE — 94760 N-INVAS EAR/PLS OXIMETRY 1: CPT

## 2019-09-10 PROCEDURE — 94761 N-INVAS EAR/PLS OXIMETRY MLT: CPT

## 2019-09-10 RX ORDER — FUROSEMIDE 20 MG/1
60 TABLET ORAL 2 TIMES DAILY
Qty: 60 TABLET | Refills: 3 | Status: SHIPPED | OUTPATIENT
Start: 2019-09-10 | End: 2019-09-28

## 2019-09-10 RX ORDER — LIDOCAINE HYDROCHLORIDE 10 MG/ML
5 INJECTION, SOLUTION EPIDURAL; INFILTRATION; INTRACAUDAL; PERINEURAL ONCE
Status: DISCONTINUED | OUTPATIENT
Start: 2019-09-10 | End: 2019-09-10 | Stop reason: HOSPADM

## 2019-09-10 RX ORDER — ASPIRIN 81 MG/1
81 TABLET, CHEWABLE ORAL DAILY
Qty: 30 TABLET | Refills: 3 | Status: SHIPPED | OUTPATIENT
Start: 2019-09-11

## 2019-09-10 RX ORDER — SODIUM CHLORIDE 0.9 % (FLUSH) 0.9 %
10 SYRINGE (ML) INJECTION EVERY 12 HOURS SCHEDULED
Status: DISCONTINUED | OUTPATIENT
Start: 2019-09-10 | End: 2019-09-10 | Stop reason: HOSPADM

## 2019-09-10 RX ORDER — SODIUM CHLORIDE 0.9 % (FLUSH) 0.9 %
10 SYRINGE (ML) INJECTION PRN
Status: DISCONTINUED | OUTPATIENT
Start: 2019-09-10 | End: 2019-09-10 | Stop reason: HOSPADM

## 2019-09-10 RX ADMIN — HYDROCODONE BITARTRATE AND ACETAMINOPHEN 1 TABLET: 5; 325 TABLET ORAL at 06:08

## 2019-09-10 RX ADMIN — VITAMIN D, TAB 1000IU (100/BT) 1000 UNITS: 25 TAB at 08:25

## 2019-09-10 RX ADMIN — ATENOLOL 25 MG: 25 TABLET ORAL at 08:26

## 2019-09-10 RX ADMIN — ASPIRIN 81 MG 81 MG: 81 TABLET ORAL at 08:25

## 2019-09-10 RX ADMIN — INSULIN LISPRO 1 UNITS: 100 INJECTION, SOLUTION INTRAVENOUS; SUBCUTANEOUS at 08:28

## 2019-09-10 RX ADMIN — INSULIN LISPRO 1 UNITS: 100 INJECTION, SOLUTION INTRAVENOUS; SUBCUTANEOUS at 12:50

## 2019-09-10 RX ADMIN — RIVAROXABAN 15 MG: 15 TABLET, FILM COATED ORAL at 08:26

## 2019-09-10 RX ADMIN — FUROSEMIDE 40 MG: 40 TABLET ORAL at 08:26

## 2019-09-10 RX ADMIN — Medication 10 ML: at 08:26

## 2019-09-10 RX ADMIN — AMOXICILLIN 500 MG: 250 CAPSULE ORAL at 08:25

## 2019-09-10 RX ADMIN — TAMSULOSIN HYDROCHLORIDE 0.4 MG: 0.4 CAPSULE ORAL at 08:26

## 2019-09-10 RX ADMIN — AMIODARONE HYDROCHLORIDE 200 MG: 200 TABLET ORAL at 08:26

## 2019-09-10 RX ADMIN — IPRATROPIUM BROMIDE AND ALBUTEROL SULFATE 1 AMPULE: .5; 3 SOLUTION RESPIRATORY (INHALATION) at 08:37

## 2019-09-10 RX ADMIN — IPRATROPIUM BROMIDE AND ALBUTEROL SULFATE 1 AMPULE: .5; 3 SOLUTION RESPIRATORY (INHALATION) at 16:26

## 2019-09-10 RX ADMIN — IPRATROPIUM BROMIDE AND ALBUTEROL SULFATE 1 AMPULE: .5; 3 SOLUTION RESPIRATORY (INHALATION) at 12:43

## 2019-09-10 ASSESSMENT — PAIN SCALES - GENERAL
PAINLEVEL_OUTOF10: 0
PAINLEVEL_OUTOF10: 0
PAINLEVEL_OUTOF10: 5
PAINLEVEL_OUTOF10: 8

## 2019-09-10 NOTE — PROGRESS NOTES
IV infiltrated. Unable to regain IV access. Ok to place midline per Dr. Jun Ambriz. PICC RN notified.

## 2019-09-10 NOTE — DISCHARGE SUMMARY
Resp 18   Ht 6' 2\" (1.88 m)   Wt 254 lb 8 oz (115.4 kg)   SpO2 95%   BMI 32.68 kg/m²   General appearance:  NAD  HEENT:   Normal cephalic, atraumatic, moist mucous membranes, no oropharyngeal erythema or exudate  Neck: Supple, trachea midline, no anterior cervical or SC LAD  Heart[de-identified] Normal s1/s2, RRR, no murmurs, gallops, or rubs. 2-3+ pitting  leg edema  Lungs:   Clear to auscultation, bilaterally without Rales/Wheezes/Rhonchi. Abdomen: Soft, non-tender, non-distended, bowel sounds present, no masses  Musculoskeletal:  No clubbing, no cyanosis, 2-3+ pitting  edema  Skin: No lesion or masses  Neurologic:  Neurovascularly intact without any focal sensory/motor deficits. Cranial nerves: II-XII intact, grossly non-focal.  Psychiatric:  A & O x3  Neuro: Grossly intact, moves all four extremities     Labs: For convenience and continuity at follow-up the following most recent labs are provided:    Lab Results   Component Value Date    WBC 3.8 09/06/2019    HGB 9.3 09/06/2019    HCT 29.6 09/06/2019    MCV 81.9 09/06/2019     09/10/2019     09/10/2019    K 4.1 09/10/2019    K 5.1 09/05/2019     09/10/2019    CO2 30 09/10/2019    BUN 33 09/10/2019    CREATININE 1.6 09/10/2019    CALCIUM 8.9 09/10/2019    PHOS 4.1 09/06/2019    ALKPHOS 222 09/05/2019    ALT 28 09/05/2019    AST 38 09/05/2019    BILITOT 1.7 09/05/2019    LABALBU 3.4 09/05/2019     Lab Results   Component Value Date    INR 2.02 (H) 09/04/2019    INR 1.10 08/14/2013       Radiology:  Xr Hip Right (2-3 Views)    Result Date: 9/4/2019  EXAMINATION: TWO XRAY VIEWS OF THE RIGHT HIP 9/4/2019 1:54 pm COMPARISON: None. HISTORY: ORDERING SYSTEM PROVIDED HISTORY: fall TECHNOLOGIST PROVIDED HISTORY: Reason for exam:->fall Reason for Exam: Fall. Left ankle and right hip pain Acuity: Acute Type of Exam: Initial FINDINGS: There is no acute fracture or dislocation. Hip joint(s) are unremarkable for age. Bones are grossly demineralized.   Surrounding soft tissues are unremarkable. The remainder of the included pelvis is unremarkable for age. No acute bone or joint abnormality. Xr Tibia Fibula Left (2 Views)    Result Date: 9/4/2019  EXAMINATION: 5 XRAY VIEWS OF THE LEFT TIBIA AND FIBULA 9/4/2019 1:54 pm COMPARISON: None. HISTORY: ORDERING SYSTEM PROVIDED HISTORY: fall TECHNOLOGIST PROVIDED HISTORY: Reason for exam:->fall Reason for Exam: Fall. Left ankle and right hip pain Acuity: Acute Type of Exam: Initial FINDINGS: Left knee arthroplasty. Osteopenia. No acute fracture. Vascular calcifications. Osteopenia. No fracture. Vascular calcifications. Ct Head Wo Contrast    Result Date: 9/4/2019  EXAMINATION: CT OF THE HEAD WITHOUT CONTRAST  9/4/2019 1:50 pm TECHNIQUE: CT of the head was performed without the administration of intravenous contrast. Dose modulation, iterative reconstruction, and/or weight based adjustment of the mA/kV was utilized to reduce the radiation dose to as low as reasonably achievable. COMPARISON: None. HISTORY: ORDERING SYSTEM PROVIDED HISTORY: fall TECHNOLOGIST PROVIDED HISTORY: If patient is on cardiac monitor and/or pulse ox, they may be taken off cardiac monitor and pulse ox, left on O2 if currently on. All monitors reattached when patient returns to room. Has a \"code stroke\" or \"stroke alert\" been called? ->No Reason for Exam: fall Acuity: Unknown Type of Exam: Unknown FINDINGS: BRAIN/VENTRICLES: There is no acute intracranial hemorrhage, mass effect or midline shift. No abnormal extra-axial fluid collection. The gray-white differentiation is maintained without evidence of an acute infarct. There is no evidence of hydrocephalus. Moderate generalized volume loss. Scattered periventricular low-attenuation likely related to chronic small vessel disease. ORBITS: Global SINUSES: The visualized paranasal sinuses and mastoid air cells demonstrate no acute abnormality.  SOFT TISSUES/SKULL:  Intracranial vascular

## 2019-09-10 NOTE — PLAN OF CARE
Problem: Falls - Risk of:  Goal: Will remain free from falls  Description  Will remain free from falls  Outcome: Ongoing  Note:   All fall precautions in place, bed in lowest position, frequent rounding to assist with toileting. Pt absent of fall this shift. Problem: Falls - Risk of:  Goal: Absence of physical injury  Description  Absence of physical injury  Note:   Frequent rounding to reposition and assist with needs. Problem: Risk for Impaired Skin Integrity  Goal: Tissue integrity - skin and mucous membranes  Description  Structural intactness and normal physiological function of skin and  mucous membranes. Outcome: Ongoing  Note:   Problem: Skin Integrity - Impaired   Intervention: Heel elevation   Heels kept elevated with pillows and foot of bed elevated. Intervention: Incontinence management   Pt is checked for incontinence with rounding, if found to be incontinent, protective wipes used to prevent any redness or dermatitis, brief and any soiled linens will be changed. Intervention: Position change   Pt repositioned with rounding and as needed. Intervention: Skin shear, friction, and tissue-load reduction   Lift sheet used to move pt around in bed to protect skin from shearing on sheets. Goal: Absence of new skin breakdown   Outcome: Ongoing  No new skin breakdown or new reddened bony prominances this shift. Will keep skin clean and dry this shift. Pt's wounds assessed and dressings changed if needed. Problem: Pain:  Goal: Pain level will decrease  Description  Pain level will decrease  Outcome: Ongoing  Note:   Pt has Norco for pain control. Problem: Pain:  Goal: Control of acute pain  Description  Control of acute pain  Outcome: Ongoing  Note:   Pt has no c/o acute pain at this time. Problem: Pain:  Goal: Control of chronic pain  Description  Control of chronic pain  Note:   Pt has no c/o chronic pain at this time.       Problem: Cardiovascular  Goal: Hemodynamic

## 2019-09-10 NOTE — PROGRESS NOTES
Physical Therapy  Facility/Department: [address]  Daily Treatment Note  NAME: Tanner Taylor  : 1940  MRN: [id number]    Date of Service: 9/10/2019    Discharge Recommendations:  Tanner Taylor scored a  on the AM-PAC short mobility form. Current research shows that an AM-PAC score of 17 or less is typically not associated with a discharge to the patient's home setting. Based on the patients AM-PAC score and their current functional mobility deficits, it is recommended that the patient have 3-5 sessions per week of Physical Therapy at d/c to increase the patients independence. PT Equipment Recommendations  Equipment Needed: No    Assessment   Body structures, Functions, Activity limitations: Decreased functional mobility ; Decreased ADL status; Decreased ROM; Decreased strength;Decreased safe awareness;Decreased endurance;Decreased balance;Decreased sensation; Increased Pain  Assessment: Significant posterior lean during standing this session. Unable to take steps with RW chair to bed due to poor standing balance. 2 person assistance for transfers. Prognosis: Fair  PT Education: PT Role;Plan of Care;Precautions;General Safety; Functional Mobility Training;Transfer Training  Patient Education: standing posture/balance  Barriers to Learning: none  REQUIRES PT FOLLOW UP: Yes  Activity Tolerance  Activity Tolerance: Patient Tolerated treatment well  Activity Tolerance: cooperative and participatory after pt agreed to therapy      Patient Diagnosis(es): The primary encounter diagnosis was Elevated troponin. A diagnosis of Fall, initial encounter was also pertinent to this visit. has a past medical history of CAD (coronary artery disease), Diabetes mellitus ([address] Hyperlipidemia, and Hypertension. has a past surgical history that includes Coronary artery bypass graft; joint replacement; and Carpal tunnel release.     Restrictions  Restrictions/Precautions  Restrictions/Precautions: Fall

## 2019-09-10 NOTE — PROGRESS NOTES
[facility]                                                                                                                       Office : [phone]     Fax :[phone]       Nephrology  Note        Chief Complaint:    Chief Complaint   Patient presents with    Fall     pt brought in by [facility], pt states he tripped over his feet this am and fell, c/o right hip and left ankle pain. Pt denies hitting his head         History of Present Ilness:    Jeff Mera is a 78 y.o. male with h/o Dm2, CAD, CABG, HFrEF who was admitted after a fall. No fracture. Had elevated troponin and edema. ECHO showed reduced EF of 35 %   Global hypokinesia     Since admitted has gradually worsening creatinine . Has edema     Interval hx     Patient refused iv access    Renal function stable. He is awake , alert and responding to commands appropriately today         I/O last 3 completed shifts: In: 1140 [P.O.:1140]  Out: 2075 [Urine:2075]  No intake/output data recorded.         Past Medical History:   Diagnosis Date    CAD (coronary artery disease)     Diabetes mellitus ([facility] [address]     Hyperlipidemia     Hypertension          Current Medications:      iron sucrose (VENOFER) 200 mg in sodium chloride 0.9 % 100 mL IVPB Q24H   melatonin tablet 3 mg Nightly   furosemide (LASIX) tablet 40 mg BID   losartan (COZAAR) tablet 50 mg Nightly   amoxicillin (AMOXIL) capsule 500 mg BID   amiodarone (CORDARONE) tablet 200 mg Daily   tamsulosin (FLOMAX) capsule 0.4 mg Daily   rivaroxaban (XARELTO) tablet 15 mg Daily with breakfast   ipratropium-albuterol (DUONEB) nebulizer solution 1 ampule Q4H WA   atenolol (TENORMIN) tablet 25 mg BID   atorvastatin (LIPITOR) tablet 40 mg Nightly   vitamin D (CHOLECALCIFEROL) tablet 1,000 Units Daily sodium chloride flush 0.9 % injection 10 mL 2 times per day   sodium chloride flush 0.9 % injection 10 mL PRN   magnesium hydroxide (MILK OF MAGNESIA) 400 MG/5ML suspension 30 mL Daily PRN   ondansetron (ZOFRAN) injection 4 mg Q6H PRN   perflutren lipid microspheres (DEFINITY) injection 1.65 mg ONCE PRN   insulin lispro (HUMALOG) injection pen 0-6 Units TID WC   insulin lispro (HUMALOG) injection pen 0-3 Units Nightly   aspirin chewable tablet 81 mg Daily   HYDROcodone-acetaminophen (NORCO) 5-325 MG per tablet 1 tablet Q6H PRN   glucose (GLUTOSE) 40 % oral gel 15 g PRN   dextrose 50 % IV solution PRN   glucagon (rDNA) injection 1 mg PRN   dextrose 5 % solution PRN       Physical exam:     Vitals:  BP (!) 160/92   Pulse 71   Temp 97.6 °F (36.4 °C) (Oral)   Resp 18   Ht 6' 2\" (1.88 m)   Wt 254 lb 8 oz (115.4 kg)   SpO2 91%   BMI 32.68 kg/m²   Constitutional:  OAA X3 NAD  Skin: no rash, turgor wnl  Heent:  eomi, mmm  Neck: no bruits or jvd noted  Cardiovascular:  S1, S2 without m/r/g  Respiratory: CTA B without w/r/r  Abdomen:  +bs, soft, nt, nd  Ext: b/ L lower extremity edema  Psychiatric: mood and affect appropriate  Musculoskeletal:  Rom, muscular strength intact    Labs:  CBC:   Recent Labs     09/08/19  0616 09/10/19  0413   * 148     BMP:    Recent Labs     09/07/19  1613 09/09/19  0849    137   K 4.3 4.2    99   CO2 27 27   BUN 30* 34*   CREATININE 1.3 1.4*   GLUCOSE 179* 175*     Ca/Mg/Phos:   Recent Labs     09/07/19  1613 09/09/19  0849   CALCIUM 8.9 8.9     Hepatic: No results for input(s): AST, ALT, ALB, BILITOT, ALKPHOS in the last 72 hours. Troponin:   Recent Labs     09/07/19  1613 09/08/19  0616   TROPONINI 0.03* 0.03*     BNP: No results for input(s): BNP in the last 72 hours. Lipids: No results for input(s): CHOL, TRIG, HDL, LDLCALC, LABVLDL in the last 72 hours. ABGs: No results for input(s): PHART, PO2ART, VHW0YII in the last 72 hours.   INR:   No results for input(s): INR in the last 72 hours. UA:  Recent Labs     09/09/19  1640   COLORU YELLOW   CLARITYU Clear   GLUCOSEU Negative   BILIRUBINUR Negative   KETUA Negative   SPECGRAV 1.011   BLOODU Negative   PHUR 6.0   PROTEINU Negative   UROBILINOGEN 1.0   NITRU Negative   LEUKOCYTESUR Negative   LABMICR Not Indicated   URINETYPE Not Specified      Urine Microscopic: No results for input(s): LABCAST, BACTERIA, COMU, HYALCAST, WBCUA, RBCUA, EPIU in the last 72 hours. Urine Culture: No results for input(s): LABURIN in the last 72 hours. Urine Chemistry:   No results for input(s): Lilliana Boatman, PROTEINUR, NAUR in the last 72 hours. IMAGING:  XR CHEST PORTABLE   Final Result   Mild pulmonary edema. Moderate right lung base opacity may represent atelectasis and pleural fluid   with pneumonia not excluded. XR TIBIA FIBULA LEFT (2 VIEWS)   Final Result   Osteopenia. No fracture. Vascular calcifications. XR HIP RIGHT (2-3 VIEWS)   Final Result   No acute bone or joint abnormality. CT Head WO Contrast   Final Result   No acute intracranial abnormality. I/O last 3 completed shifts: In: 1140 [P.O.:1140]  Out: 2075 [Urine:2075]          Assessment/Plan :      1.  MARIA DOLORES likely CRS  Has volume overload state   Continue lasix PO . Increase dose to 60 mg po bID   Decreased dose of losartan to 50 mg po daily     2. HTN. Controlled   3. Anemia. Has iron deficiency   Will give iv venofer while he is admitted   After that start PO iron supplements     4. Acid- base disorder. Metabolic alkalosis . Monitor     5. Electrolytes.  Monitor while on lasix                   Thank you for allowing us to participate in care of Gerard Alonso         Electronically signed by: Rubi Everett MD, 9/10/2019, 10:13 AM      [facility] [address]  Office : [phone]  Fax :[phone]

## 2019-09-10 NOTE — PROGRESS NOTES
Occupational Therapy  Facility/Department: [address]  Daily Treatment Note  NAME: Alfred Graham  : 1940  MRN: [id number]    Date of Service: 9/10/2019    Discharge Recommendations:      Alfred Graham scored a 13/24 on the AM-PAC ADL Inpatient form. Current research shows that an AM-PAC score of 17 or less is typically not associated with a discharge to the patient's home setting. Based on the patients AM-PAC score and their current ADL deficits, it is recommended that the patient have 3-5 sessions per week of Occupational Therapy at d/c to increase the patients independence. OT Equipment Recommendations  Equipment Needed: No  Other: If pt is to d/c home, he will require 24 hour supervision, alert button, and lift equipment secondary to increased need for assist at this time     Assessment   Performance deficits / Impairments: Decreased functional mobility ; Decreased balance;Decreased ADL status; Decreased strength;Decreased endurance;Decreased safe awareness  Assessment: Pt not at baseline and would benefit from skilled OT services in order to return to [facility]. pt reports multiple falls in the last 6 months. He presents with decreased safety awareness and insight to performance deficits and safety in returning david upon discharge. pt is weaker than baseline and presents with decreased balance to complete ADL tasks and functional mobility in his home   Treatment Diagnosis: fall   Prognosis: Fair;Good  History: Pt lives in tri level home with spouse who has dementia. Pt reports at times he has fallen and his wife will not assist him by calling EMS and he has laid on the floor for extended periods of time. Pt has chair lift but has failed mutiple times. Independent with ADL tasks. wife does cooking, cleaning, laundry.  per chart, family feels pt is no longer safe at the home   Assistance / Modification: assist of 2   OT Education: OT Role;Transfer Training;ADL Adaptive Strategies  Patient Education: OT KATHIE goodson, extensive education on discharge recommendation, safety awareness, importance of working with therapy, benefits of being up in the chair. Barriers to Learning: cognition and safety awareness  REQUIRES OT FOLLOW UP: Yes  Activity Tolerance  Activity Tolerance: Patient Tolerated treatment well;Patient limited by fatigue  Safety Devices  Safety Devices in place: Yes  Type of devices: All fall risk precautions in place; Bed alarm in place;Call light within reach;Gait belt;Patient at risk for falls; Left in bed;Nurse notified         Patient Diagnosis(es): The primary encounter diagnosis was Elevated troponin. A diagnosis of Fall, initial encounter was also pertinent to this visit. has a past medical history of CAD (coronary artery disease), Diabetes mellitus ([address] Hyperlipidemia, and Hypertension. has a past surgical history that includes Coronary artery bypass graft; joint replacement; and Carpal tunnel release. Restrictions  Restrictions/Precautions  Restrictions/Precautions: Fall Risk(High Fall Risk )  Required Braces or Orthoses?: No  Position Activity Restriction  Other position/activity restrictions: Laurie Marrero is a 78 y.o. male presents to the emergency department after falling this morning. The patient reports that he had to void, got up to use a urinal at 6. States that he went back to bed because his wife and not awaken. Reports that he needed to void again at 8 but did not have a urinal available upstairs, got on the stair lift to go down the stairs and the stair lift chair failed, states that he \"bailed out\", he was on the ground from 8:00 this morning until about 1300 today. It is unclear how he was found. Patient is complaining of pain to the left tib-fib and left hip. Denies other injury. Does not believe that he hit his head, denies loss of consciousness.  xray negative for acute fx  Subjective   General  Chart Reviewed: Yes  Response to previous treatment: Patient with no errors;Assistance required to identify errors made;Assistance required to correct errors made  Initiation: Does not require cues  Sequencing: Requires cues for some  Cognition Comment: Pt expressing expectation of going to rehab at this session and recognizing need for assistance.          Plan   Plan  Times per week: 3-5  Times per day: Daily  Plan weeks: cotx  Current Treatment Recommendations: Strengthening, Endurance Training, Self-Care / ADL, Balance Training, Functional Mobility Training, Safety Education & Training, Patient/Caregiver Education & Training    AM-PAC Score        AM-[facility] Inpatient Daily Activity Raw Score: 13 (09/10/19 1551)  AM-PAC Inpatient ADL T-Scale Score : 32.03 (09/10/19 1551)  ADL Inpatient CMS 0-100% Score: 63.03 (09/10/19 1551)  ADL Inpatient CMS G-Code Modifier : CL (09/10/19 1551)    Goals  Short term goals  Time Frame for Short term goals: discharge  Short term goal 1: bed mobility supervision--Min A sit to supine 9/10  Short term goal 2: functional ADL transfer Yen--dependent/total use of STEDY 9/10  Short term goal 3: functional mobility with appropriate AD modA--Not addressed 9/10  Short term goal 4: UB ADLs supervision--set up/SBA UB bathing/UB dressing min a 9/10  Short term goal 5: LB ADLs Yen--dependent/total a donning/doffing pants and depends 9/10  Patient Goals   Patient goals : return home       Therapy Time   Individual Concurrent Group Co-treatment   Time In       1501   Time Out       1535   Minutes       34       Timed Code Treatment Minutes:  34 Minutes    Total Treatment Minutes:  34 minutes    [address]

## 2019-09-10 NOTE — PLAN OF CARE
Problem: Falls - Risk of:  Goal: Will remain free from falls  Description  Will remain free from falls  9/10/2019 1244 by Nara Bonds RN  Outcome: Ongoing     Problem: Risk for Impaired Skin Integrity  Goal: Tissue integrity - skin and mucous membranes  Description  Structural intactness and normal physiological function of skin and  mucous membranes. 9/10/2019 1244 by Nara Bonds RN  Outcome: Ongoing     Problem: Pain:  Goal: Pain level will decrease  Description  Pain level will decrease  9/10/2019 1244 by Nara Bonds RN  Outcome: Ongoing        Problem: Musculor/Skeletal Functional Status  Goal: Highest potential functional level  9/10/2019 1244 by Nara Bonds RN  Outcome: Ongoing    Patient has been calm and cooperative so far this shift. Currently sitting up in chair, call light within reach.

## 2019-09-12 ENCOUNTER — TELEPHONE (OUTPATIENT)
Dept: OTHER | Age: 79
End: 2019-09-12

## 2019-09-24 ENCOUNTER — TELEPHONE (OUTPATIENT)
Dept: OTHER | Age: 79
End: 2019-09-24

## 2019-09-28 ENCOUNTER — HOSPITAL ENCOUNTER (INPATIENT)
Age: 79
LOS: 1 days | Discharge: HOSPICE | DRG: 871 | End: 2019-09-29
Attending: EMERGENCY MEDICINE | Admitting: INTERNAL MEDICINE
Payer: MEDICARE

## 2019-09-28 ENCOUNTER — APPOINTMENT (OUTPATIENT)
Dept: CT IMAGING | Age: 79
DRG: 871 | End: 2019-09-28
Payer: MEDICARE

## 2019-09-28 ENCOUNTER — APPOINTMENT (OUTPATIENT)
Dept: GENERAL RADIOLOGY | Age: 79
DRG: 871 | End: 2019-09-28
Payer: MEDICARE

## 2019-09-28 DIAGNOSIS — R41.82 ALTERED MENTAL STATUS, UNSPECIFIED ALTERED MENTAL STATUS TYPE: Primary | ICD-10-CM

## 2019-09-28 DIAGNOSIS — N19 UREMIA: ICD-10-CM

## 2019-09-28 DIAGNOSIS — N17.9 ACUTE RENAL FAILURE, UNSPECIFIED ACUTE RENAL FAILURE TYPE (HCC): ICD-10-CM

## 2019-09-28 DIAGNOSIS — R77.8 ELEVATED TROPONIN: ICD-10-CM

## 2019-09-28 PROBLEM — N39.0 UTI (URINARY TRACT INFECTION): Status: ACTIVE | Noted: 2019-09-28

## 2019-09-28 PROBLEM — D61.818 PANCYTOPENIA (HCC): Status: ACTIVE | Noted: 2019-09-28

## 2019-09-28 PROBLEM — R65.20 SEVERE SEPSIS (HCC): Status: ACTIVE | Noted: 2019-09-28

## 2019-09-28 PROBLEM — N18.30 ACUTE RENAL FAILURE SUPERIMPOSED ON STAGE 3 CHRONIC KIDNEY DISEASE (HCC): Status: ACTIVE | Noted: 2019-09-28

## 2019-09-28 PROBLEM — R60.1 ANASARCA: Status: ACTIVE | Noted: 2019-09-28

## 2019-09-28 PROBLEM — J90 PLEURAL EFFUSION, RIGHT: Status: ACTIVE | Noted: 2019-09-28

## 2019-09-28 PROBLEM — I95.9 HYPOTENSION: Status: ACTIVE | Noted: 2019-09-28

## 2019-09-28 PROBLEM — G93.41 ACUTE METABOLIC ENCEPHALOPATHY: Status: ACTIVE | Noted: 2019-09-28

## 2019-09-28 PROBLEM — E87.5 HYPERKALEMIA: Status: ACTIVE | Noted: 2019-09-28

## 2019-09-28 PROBLEM — E87.20 METABOLIC ACIDOSIS: Status: ACTIVE | Noted: 2019-09-28

## 2019-09-28 PROBLEM — E83.51 HYPOCALCEMIA: Status: ACTIVE | Noted: 2019-09-28

## 2019-09-28 PROBLEM — A41.9 SEVERE SEPSIS (HCC): Status: ACTIVE | Noted: 2019-09-28

## 2019-09-28 LAB
A/G RATIO: 1 (ref 1.1–2.2)
ALBUMIN SERPL-MCNC: 3.3 G/DL (ref 3.4–5)
ALP BLD-CCNC: 214 U/L (ref 40–129)
ALT SERPL-CCNC: 28 U/L (ref 10–40)
ANION GAP SERPL CALCULATED.3IONS-SCNC: 10 MMOL/L (ref 3–16)
ANION GAP SERPL CALCULATED.3IONS-SCNC: 12 MMOL/L (ref 3–16)
AST SERPL-CCNC: 27 U/L (ref 15–37)
BACTERIA: ABNORMAL /HPF
BASE EXCESS VENOUS: -7.9 MMOL/L (ref -3–3)
BILIRUB SERPL-MCNC: 0.9 MG/DL (ref 0–1)
BILIRUBIN URINE: ABNORMAL
BLOOD, URINE: ABNORMAL
BUN BLDV-MCNC: 108 MG/DL (ref 7–20)
BUN BLDV-MCNC: 88 MG/DL (ref 7–20)
CALCIUM SERPL-MCNC: 6.5 MG/DL (ref 8.3–10.6)
CALCIUM SERPL-MCNC: 8.2 MG/DL (ref 8.3–10.6)
CARBOXYHEMOGLOBIN: 3.9 % (ref 0–1.5)
CASTS 2: ABNORMAL /LPF
CASTS: ABNORMAL /LPF
CHLORIDE BLD-SCNC: 103 MMOL/L (ref 99–110)
CHLORIDE BLD-SCNC: 108 MMOL/L (ref 99–110)
CLARITY: ABNORMAL
CO2: 16 MMOL/L (ref 21–32)
CO2: 21 MMOL/L (ref 21–32)
COLOR: ABNORMAL
CREAT SERPL-MCNC: 3.2 MG/DL (ref 0.8–1.3)
CREAT SERPL-MCNC: 4.3 MG/DL (ref 0.8–1.3)
EKG ATRIAL RATE: 162 BPM
EKG DIAGNOSIS: NORMAL
EKG P AXIS: -71 DEGREES
EKG P-R INTERVAL: 76 MS
EKG Q-T INTERVAL: 100 MS
EKG QRS DURATION: 4 MS
EKG QTC CALCULATION (BAZETT): 164 MS
EKG R AXIS: 0 DEGREES
EKG T AXIS: 216 DEGREES
EKG VENTRICULAR RATE: 162 BPM
EPITHELIAL CELLS, UA: ABNORMAL /HPF
GFR AFRICAN AMERICAN: 16
GFR AFRICAN AMERICAN: 23
GFR NON-AFRICAN AMERICAN: 13
GFR NON-AFRICAN AMERICAN: 19
GLOBULIN: 3.3 G/DL
GLUCOSE BLD-MCNC: 207 MG/DL (ref 70–99)
GLUCOSE BLD-MCNC: 216 MG/DL (ref 70–99)
GLUCOSE BLD-MCNC: 277 MG/DL (ref 70–99)
GLUCOSE URINE: NEGATIVE MG/DL
HCO3 VENOUS: 19.9 MMOL/L (ref 23–29)
HEMOGLOBIN, VEN, REDUCED: 3 %
INR BLD: 2.22 (ref 0.86–1.14)
KETONES, URINE: 15 MG/DL
LACTIC ACID, SEPSIS: 1.6 MMOL/L (ref 0.4–1.9)
LEUKOCYTE ESTERASE, URINE: ABNORMAL
LIPASE: 16 U/L (ref 13–60)
METHEMOGLOBIN VENOUS: 0.2 %
MICROSCOPIC EXAMINATION: YES
NITRITE, URINE: NEGATIVE
O2 CONTENT, VEN: 12 VOL %
O2 SAT, VEN: 97 %
O2 THERAPY: ABNORMAL
PCO2, VEN: 51 MMHG (ref 40–50)
PERFORMED ON: ABNORMAL
PH UA: 5 (ref 5–8)
PH VENOUS: 7.2 (ref 7.35–7.45)
PO2, VEN: 97 MMHG (ref 25–40)
POTASSIUM REFLEX MAGNESIUM: 6.5 MMOL/L (ref 3.5–5.1)
POTASSIUM SERPL-SCNC: 4.8 MMOL/L (ref 3.5–5.1)
PRO-BNP: 4275 PG/ML (ref 0–449)
PROTEIN UA: >=300 MG/DL
PROTHROMBIN TIME: 25.3 SEC (ref 9.8–13)
RBC UA: ABNORMAL /HPF (ref 0–2)
SODIUM BLD-SCNC: 134 MMOL/L (ref 136–145)
SODIUM BLD-SCNC: 136 MMOL/L (ref 136–145)
SPECIFIC GRAVITY UA: 1.02 (ref 1–1.03)
TCO2 CALC VENOUS: 48 MMOL/L
TOTAL PROTEIN: 6.6 G/DL (ref 6.4–8.2)
TROPONIN: 0.1 NG/ML
URINE TYPE: ABNORMAL
UROBILINOGEN, URINE: 1 E.U./DL
WBC UA: >100 /HPF (ref 0–5)

## 2019-09-28 PROCEDURE — 80053 COMPREHEN METABOLIC PANEL: CPT

## 2019-09-28 PROCEDURE — 6360000002 HC RX W HCPCS: Performed by: EMERGENCY MEDICINE

## 2019-09-28 PROCEDURE — 81001 URINALYSIS AUTO W/SCOPE: CPT

## 2019-09-28 PROCEDURE — 2500000003 HC RX 250 WO HCPCS: Performed by: EMERGENCY MEDICINE

## 2019-09-28 PROCEDURE — 74176 CT ABD & PELVIS W/O CONTRAST: CPT

## 2019-09-28 PROCEDURE — 2700000000 HC OXYGEN THERAPY PER DAY

## 2019-09-28 PROCEDURE — 2580000003 HC RX 258: Performed by: EMERGENCY MEDICINE

## 2019-09-28 PROCEDURE — 96365 THER/PROPH/DIAG IV INF INIT: CPT

## 2019-09-28 PROCEDURE — 96368 THER/DIAG CONCURRENT INF: CPT

## 2019-09-28 PROCEDURE — 94761 N-INVAS EAR/PLS OXIMETRY MLT: CPT

## 2019-09-28 PROCEDURE — 71045 X-RAY EXAM CHEST 1 VIEW: CPT

## 2019-09-28 PROCEDURE — 96372 THER/PROPH/DIAG INJ SC/IM: CPT

## 2019-09-28 PROCEDURE — 6370000000 HC RX 637 (ALT 250 FOR IP): Performed by: EMERGENCY MEDICINE

## 2019-09-28 PROCEDURE — 93005 ELECTROCARDIOGRAM TRACING: CPT | Performed by: EMERGENCY MEDICINE

## 2019-09-28 PROCEDURE — 87040 BLOOD CULTURE FOR BACTERIA: CPT

## 2019-09-28 PROCEDURE — 70450 CT HEAD/BRAIN W/O DYE: CPT

## 2019-09-28 PROCEDURE — 99291 CRITICAL CARE FIRST HOUR: CPT

## 2019-09-28 PROCEDURE — 94640 AIRWAY INHALATION TREATMENT: CPT

## 2019-09-28 PROCEDURE — 85025 COMPLETE CBC W/AUTO DIFF WBC: CPT

## 2019-09-28 PROCEDURE — 85610 PROTHROMBIN TIME: CPT

## 2019-09-28 PROCEDURE — 96375 TX/PRO/DX INJ NEW DRUG ADDON: CPT

## 2019-09-28 PROCEDURE — 84484 ASSAY OF TROPONIN QUANT: CPT

## 2019-09-28 PROCEDURE — 96361 HYDRATE IV INFUSION ADD-ON: CPT

## 2019-09-28 PROCEDURE — 2060000000 HC ICU INTERMEDIATE R&B

## 2019-09-28 PROCEDURE — 96366 THER/PROPH/DIAG IV INF ADDON: CPT

## 2019-09-28 PROCEDURE — 87086 URINE CULTURE/COLONY COUNT: CPT

## 2019-09-28 PROCEDURE — 83880 ASSAY OF NATRIURETIC PEPTIDE: CPT

## 2019-09-28 PROCEDURE — 83690 ASSAY OF LIPASE: CPT

## 2019-09-28 PROCEDURE — 83605 ASSAY OF LACTIC ACID: CPT

## 2019-09-28 PROCEDURE — 82803 BLOOD GASES ANY COMBINATION: CPT

## 2019-09-28 RX ORDER — NICOTINE POLACRILEX 4 MG
15 LOZENGE BUCCAL PRN
Status: DISCONTINUED | OUTPATIENT
Start: 2019-09-28 | End: 2019-09-29 | Stop reason: HOSPADM

## 2019-09-28 RX ORDER — SODIUM CHLORIDE 0.9 % (FLUSH) 0.9 %
10 SYRINGE (ML) INJECTION EVERY 12 HOURS SCHEDULED
Status: DISCONTINUED | OUTPATIENT
Start: 2019-09-28 | End: 2019-09-29 | Stop reason: SDUPTHER

## 2019-09-28 RX ORDER — FUROSEMIDE 10 MG/ML
40 INJECTION INTRAMUSCULAR; INTRAVENOUS ONCE
Status: DISCONTINUED | OUTPATIENT
Start: 2019-09-28 | End: 2019-09-28

## 2019-09-28 RX ORDER — 0.9 % SODIUM CHLORIDE 0.9 %
500 INTRAVENOUS SOLUTION INTRAVENOUS ONCE
Status: COMPLETED | OUTPATIENT
Start: 2019-09-28 | End: 2019-09-28

## 2019-09-28 RX ORDER — METOLAZONE 2.5 MG/1
2.5 TABLET ORAL DAILY
COMMUNITY

## 2019-09-28 RX ORDER — NALOXONE HYDROCHLORIDE 1 MG/ML
0.4 INJECTION INTRAMUSCULAR; INTRAVENOUS; SUBCUTANEOUS ONCE
Status: DISCONTINUED | OUTPATIENT
Start: 2019-09-28 | End: 2019-09-29 | Stop reason: HOSPADM

## 2019-09-28 RX ORDER — DEXTROSE MONOHYDRATE 25 G/50ML
12.5 INJECTION, SOLUTION INTRAVENOUS PRN
Status: DISCONTINUED | OUTPATIENT
Start: 2019-09-28 | End: 2019-09-29 | Stop reason: HOSPADM

## 2019-09-28 RX ORDER — HALOPERIDOL 5 MG/ML
2.5 INJECTION INTRAMUSCULAR ONCE
Status: COMPLETED | OUTPATIENT
Start: 2019-09-28 | End: 2019-09-28

## 2019-09-28 RX ORDER — FUROSEMIDE 10 MG/ML
80 INJECTION INTRAMUSCULAR; INTRAVENOUS ONCE
Status: COMPLETED | OUTPATIENT
Start: 2019-09-28 | End: 2019-09-28

## 2019-09-28 RX ORDER — CALCIUM GLUCONATE 94 MG/ML
1 INJECTION, SOLUTION INTRAVENOUS ONCE
Status: DISCONTINUED | OUTPATIENT
Start: 2019-09-28 | End: 2019-09-28 | Stop reason: ALTCHOICE

## 2019-09-28 RX ORDER — MORPHINE SULFATE 2 MG/ML
2 INJECTION, SOLUTION INTRAMUSCULAR; INTRAVENOUS
Status: COMPLETED | OUTPATIENT
Start: 2019-09-28 | End: 2019-09-29

## 2019-09-28 RX ORDER — DEXTROSE MONOHYDRATE 50 MG/ML
100 INJECTION, SOLUTION INTRAVENOUS PRN
Status: DISCONTINUED | OUTPATIENT
Start: 2019-09-28 | End: 2019-09-29 | Stop reason: HOSPADM

## 2019-09-28 RX ORDER — OXYCODONE HYDROCHLORIDE AND ACETAMINOPHEN 5; 325 MG/1; MG/1
1 TABLET ORAL EVERY 4 HOURS PRN
COMMUNITY

## 2019-09-28 RX ORDER — DEXTROSE MONOHYDRATE 25 G/50ML
25 INJECTION, SOLUTION INTRAVENOUS ONCE
Status: COMPLETED | OUTPATIENT
Start: 2019-09-28 | End: 2019-09-28

## 2019-09-28 RX ORDER — SODIUM CHLORIDE 0.9 % (FLUSH) 0.9 %
10 SYRINGE (ML) INJECTION PRN
Status: DISCONTINUED | OUTPATIENT
Start: 2019-09-28 | End: 2019-09-29 | Stop reason: SDUPTHER

## 2019-09-28 RX ORDER — 0.9 % SODIUM CHLORIDE 0.9 %
1000 INTRAVENOUS SOLUTION INTRAVENOUS ONCE
Status: COMPLETED | OUTPATIENT
Start: 2019-09-28 | End: 2019-09-28

## 2019-09-28 RX ORDER — FUROSEMIDE 80 MG
80 TABLET ORAL DAILY
COMMUNITY

## 2019-09-28 RX ORDER — POTASSIUM CHLORIDE 750 MG/1
10 TABLET, EXTENDED RELEASE ORAL DAILY
COMMUNITY

## 2019-09-28 RX ORDER — ALBUMIN (HUMAN) 12.5 G/50ML
25 SOLUTION INTRAVENOUS EVERY 8 HOURS
Status: DISCONTINUED | OUTPATIENT
Start: 2019-09-28 | End: 2019-09-29 | Stop reason: HOSPADM

## 2019-09-28 RX ADMIN — FUROSEMIDE 80 MG: 10 INJECTION, SOLUTION INTRAMUSCULAR; INTRAVENOUS at 16:52

## 2019-09-28 RX ADMIN — SODIUM CHLORIDE 500 ML: 9 INJECTION, SOLUTION INTRAVENOUS at 15:11

## 2019-09-28 RX ADMIN — INSULIN HUMAN 10 UNITS: 100 INJECTION, SOLUTION PARENTERAL at 16:44

## 2019-09-28 RX ADMIN — CEFEPIME HYDROCHLORIDE 2 G: 2 INJECTION, POWDER, FOR SOLUTION INTRAVENOUS at 17:00

## 2019-09-28 RX ADMIN — CALCIUM GLUCONATE 1 G: 98 INJECTION, SOLUTION INTRAVENOUS at 16:15

## 2019-09-28 RX ADMIN — SODIUM CHLORIDE 500 ML: 9 INJECTION, SOLUTION INTRAVENOUS at 21:39

## 2019-09-28 RX ADMIN — CALCIUM GLUCONATE 1 G: 98 INJECTION, SOLUTION INTRAVENOUS at 17:02

## 2019-09-28 RX ADMIN — VANCOMYCIN HYDROCHLORIDE 1750 MG: 10 INJECTION, POWDER, LYOPHILIZED, FOR SOLUTION INTRAVENOUS at 18:15

## 2019-09-28 RX ADMIN — ALBUTEROL SULFATE 10 MG: 2.5 SOLUTION RESPIRATORY (INHALATION) at 17:45

## 2019-09-28 RX ADMIN — MORPHINE SULFATE 2 MG: 2 INJECTION, SOLUTION INTRAMUSCULAR; INTRAVENOUS at 21:39

## 2019-09-28 RX ADMIN — MORPHINE SULFATE 2 MG: 2 INJECTION, SOLUTION INTRAMUSCULAR; INTRAVENOUS at 19:21

## 2019-09-28 RX ADMIN — FUROSEMIDE 10 MG/HR: 10 INJECTION, SOLUTION INTRAMUSCULAR; INTRAVENOUS at 16:53

## 2019-09-28 RX ADMIN — SODIUM CHLORIDE 1000 ML: 9 INJECTION, SOLUTION INTRAVENOUS at 16:43

## 2019-09-28 RX ADMIN — SODIUM BICARBONATE 50 MEQ: 84 INJECTION, SOLUTION INTRAVENOUS at 16:44

## 2019-09-28 RX ADMIN — HALOPERIDOL LACTATE 2.5 MG: 5 INJECTION INTRAMUSCULAR at 16:05

## 2019-09-28 RX ADMIN — DEXTROSE MONOHYDRATE 25 G: 500 INJECTION PARENTERAL at 16:43

## 2019-09-28 ASSESSMENT — PAIN SCALES - GENERAL
PAINLEVEL_OUTOF10: 4
PAINLEVEL_OUTOF10: 4

## 2019-09-28 NOTE — PROGRESS NOTES
Discussed with Dr. Dimitrios Liang. Has significant volume overload. MARIA DOLORES on CKD. Has history of advanced congestive heart failure. Hyperkalemia. Given calcium gluconate. Give D50 and 10 units of regular insulin. One half of sodium bicarbonate already given. Repeat another sodium bicarbonate. Give Lasix 80 mg IV x1 stat and start on Lasix drip. Penn inserted. Monitor I's and O's. Repeat BMP. Blood pressure is l ow. Recommend to keep the map around 65. Will discuss with his daughter and see if she will consent for CRRT if needed.

## 2019-09-28 NOTE — ED NOTES
Pt refusing EKG and CT Scan. Dr Hui Zaman made aware. \"I just want to die. Dont touch me. \"     Austin Mcclellan, BRYANT  09/28/19 4760

## 2019-09-28 NOTE — ED PROVIDER NOTES
ears normal.      Nose: Nose normal.     Mouth: Membrane mucosa moist   Eyes: No discharge. Neck: Supple. Trachea midline. Cardiovascular: Regular rate. Warm extremities  Pulmonary/Chest: Effort normal. No respiratory distress. CTAB. Abdominal: Soft. No distension. Nontender  : Deferred  Rectal: Deferred   Musculoskeletal: Moves all extremities. No gross deformity. Neurological: Alert and oriented. Face symmetric. Speech is clear. Skin: Warm and dry. No rash. Edematous generalized, including scrotum  Psychiatric: Delirious    Procedures      EKG  The Ekg interpreted by me in the absence of a cardiologist shows. Paced rhythm, widened QRS      Radiology  XR CHEST PORTABLE   Final Result   Suspected small right pleural effusion causing hazy opacity to the right   chest.  Multi lobar pneumonia possible but thought to be less likely. RECOMMENDATION:   CT chest could be performed for more thorough evaluation if clinical picture   is uncertain.          CT head without contrast    (Results Pending)   CT ABDOMEN PELVIS WO CONTRAST Additional Contrast? None    (Results Pending)       Labs  Results for orders placed or performed during the hospital encounter of 09/28/19   Lactate, Sepsis   Result Value Ref Range    Lactic Acid, Sepsis 1.6 0.4 - 1.9 mmol/L   Blood gas, venous   Result Value Ref Range    pH, Sarthak 7.199 (LL) 7.350 - 7.450    pCO2, Sarthak 51.0 (H) 40.0 - 50.0 mmHg    pO2, Sarthak 97.0 (H) 25.0 - 40.0 mmHg    HCO3, Venous 19.9 (L) 23.0 - 29.0 mmol/L    Base Excess, Sarthak -7.9 (L) -3.0 - 3.0 mmol/L    O2 Sat, Sarthak 97 Not Established %    Carboxyhemoglobin 3.9 (H) 0.0 - 1.5 %    MetHgb, Sarthak 0.2 <1.5 %    TC02 (Calc), Sarthak 48 Not Established mmol/L    O2 Content, Sarthak 12 Not Established VOL %    O2 Therapy Unknown     Hemoglobin, Sarthak, Reduced 3 %   CBC Auto Differential   Result Value Ref Range    WBC 3.2 (L) 4.0 - 11.0 K/uL    RBC 3.47 (L) 4.20 - 5.90 M/uL    Hemoglobin 8.6 (L) 13.5 - 17.5 g/dL    Hematocrit 29.4 pacemaker; interpretation is based on intrinsic rhythmSinus rhythm with occasional and consecutive Premature ventricular complexes and Fusion complexes with ventricular escape complexesNon-specific intra-ventricular conduction blockLateral infarct , age undeterminedAbnormal ECG   EKG 12 Lead   Result Value Ref Range    Ventricular Rate 67 BPM    Atrial Rate 46 BPM    QRS Duration 148 ms    Q-T Interval 524 ms    QTc Calculation (Bazett) 553 ms    R Axis -89 degrees    T Axis 73 degrees    Diagnosis       Atrial fibrillation with premature ventricular or aberrantly conducted complexesLeft axis deviationNon-specific intra-ventricular conduction blockInferior infarct , age undeterminedAbnormal ECG       Screenings           MDM and ED Course  Patient is an 76-year-old man with past medical history of CHF, atrial fibrillation, who presents with unresponsiveness. Likely secondary to narcotic overdose. Was recently given oxycodone this morning. Appears to be protecting his airway at this time. Patient is not currently delirious. Likely from uremia. Has severe MARIA DOLORES. May possibly need hemodialysis. Unclear why he has new MARIA DOLORES, however may be secondary to increased Lasix doses at nursing home. Euglycemic. Unclear whether or not he had any head trauma therefore will obtain CT head. Urine as cloudy appearing. Will empirically treat for infection given presence of hypotension and altered mental status. Will give IVF. (EMP MDM)    Discussed with Dr. Singh White at 4:15 PM.  Recommended Lasix bolus 80 mg as well as 10 mg/h infusion of Lasix. Found severe hyperkalemia. Given 2 g of IV calcium gluconate to begin with as well as insulin, dextrose, albuterol. Had long family conversation with patient's wife, daughters, son at about 6:30 PM.  They do not want the patient to receive dialysis. They do not believe that he would have wanted this.   They have not decided on whether or not they would want pressors or a central line.  They wish for him to receive some form of hospice care. They wish for his pain to be treated even if it may worsen his chances of survival.  They want him to be DNR, DO NOT INTUBATE. They also do not want him to have a central line or pressors. The total critical care time spent while evaluating and treating this patient was at least 50 minutes. This excludes time spent doing separately billable procedures. This includes time at the bedside, data interpretation, medication management, obtaining critical history from collateral sources if the patient is unable to provide it directly, and physician consultation. Specifics of interventions taken and potentially life-threatening diagnostic considerations are listed above in the medical decision making. [unfilled]    Final Impression  1. Altered mental status, unspecified altered mental status type    2. Elevated troponin    3. Acute renal failure, unspecified acute renal failure type ([facility] [address] [facility]    4. Uremia        Blood pressure (!) 83/50, pulse 67, temperature 96.7 °F (35.9 °C), temperature source Infrared, resp. rate 15, weight 275 lb (124.7 kg), SpO2 99 %. Disposition:  DISPOSITION Decision To Admit 09/28/2019 07:06:29 PM      Patient Referrals:  No follow-up provider specified. Discharge Medications:  New Prescriptions    No medications on file       Discontinued Medications:  Discontinued Medications    No medications on file       This chart was generated using the [address] [place] [address]  [employer] system. I created this record but it may contain dictation errors given the limitations of this technology.         Jacqueline Burch MD  09/28/19 9575

## 2019-09-29 VITALS
HEART RATE: 70 BPM | BODY MASS INDEX: 36.81 KG/M2 | TEMPERATURE: 97.2 F | SYSTOLIC BLOOD PRESSURE: 82 MMHG | HEIGHT: 74 IN | OXYGEN SATURATION: 92 % | WEIGHT: 286.82 LBS | DIASTOLIC BLOOD PRESSURE: 43 MMHG | RESPIRATION RATE: 24 BRPM

## 2019-09-29 LAB
A/G RATIO: 1 (ref 1.1–2.2)
ALBUMIN SERPL-MCNC: 3.3 G/DL (ref 3.4–5)
ALP BLD-CCNC: 192 U/L (ref 40–129)
ALT SERPL-CCNC: 28 U/L (ref 10–40)
ANION GAP SERPL CALCULATED.3IONS-SCNC: 14 MMOL/L (ref 3–16)
AST SERPL-CCNC: 24 U/L (ref 15–37)
BANDED NEUTROPHILS RELATIVE PERCENT: 1 % (ref 0–7)
BASOPHILS ABSOLUTE: 0 K/UL (ref 0–0.2)
BASOPHILS ABSOLUTE: 0.1 K/UL (ref 0–0.2)
BASOPHILS RELATIVE PERCENT: 0 %
BASOPHILS RELATIVE PERCENT: 1.4 %
BILIRUB SERPL-MCNC: 1.1 MG/DL (ref 0–1)
BLASTS RELATIVE PERCENT: 0 %
BUN BLDV-MCNC: 101 MG/DL (ref 7–20)
BURR CELLS: ABNORMAL
CALCIUM SERPL-MCNC: 8.4 MG/DL (ref 8.3–10.6)
CHLORIDE BLD-SCNC: 101 MMOL/L (ref 99–110)
CO2: 19 MMOL/L (ref 21–32)
CREAT SERPL-MCNC: 4.1 MG/DL (ref 0.8–1.3)
CRENATED RBC'S: ABNORMAL
EKG ATRIAL RATE: 46 BPM
EKG ATRIAL RATE: 76 BPM
EKG ATRIAL RATE: 80 BPM
EKG DIAGNOSIS: NORMAL
EKG P AXIS: -24 DEGREES
EKG P-R INTERVAL: 130 MS
EKG P-R INTERVAL: 80 MS
EKG Q-T INTERVAL: 524 MS
EKG Q-T INTERVAL: 530 MS
EKG Q-T INTERVAL: 562 MS
EKG QRS DURATION: 148 MS
EKG QRS DURATION: 172 MS
EKG QRS DURATION: 176 MS
EKG QTC CALCULATION (BAZETT): 553 MS
EKG QTC CALCULATION (BAZETT): 596 MS
EKG QTC CALCULATION (BAZETT): 648 MS
EKG R AXIS: -89 DEGREES
EKG R AXIS: 104 DEGREES
EKG R AXIS: 106 DEGREES
EKG T AXIS: -67 DEGREES
EKG T AXIS: 255 DEGREES
EKG T AXIS: 73 DEGREES
EKG VENTRICULAR RATE: 67 BPM
EKG VENTRICULAR RATE: 76 BPM
EKG VENTRICULAR RATE: 80 BPM
EOSINOPHILS ABSOLUTE: 0 K/UL (ref 0–0.6)
EOSINOPHILS ABSOLUTE: 0 K/UL (ref 0–0.6)
EOSINOPHILS RELATIVE PERCENT: 0 %
EOSINOPHILS RELATIVE PERCENT: 0.2 %
GFR AFRICAN AMERICAN: 17
GFR NON-AFRICAN AMERICAN: 14
GLOBULIN: 3.3 G/DL
GLUCOSE BLD-MCNC: 170 MG/DL (ref 70–99)
HCT VFR BLD CALC: 29.4 % (ref 40.5–52.5)
HCT VFR BLD CALC: 29.6 % (ref 40.5–52.5)
HEMATOLOGY PATH CONSULT: NO
HEMOGLOBIN: 8.6 G/DL (ref 13.5–17.5)
HEMOGLOBIN: 8.9 G/DL (ref 13.5–17.5)
LYMPHOCYTES ABSOLUTE: 0.2 K/UL (ref 1–5.1)
LYMPHOCYTES ABSOLUTE: 0.4 K/UL (ref 1–5.1)
LYMPHOCYTES RELATIVE PERCENT: 13 %
LYMPHOCYTES RELATIVE PERCENT: 4.6 %
MACROCYTES: ABNORMAL
MCH RBC QN AUTO: 24.8 PG (ref 26–34)
MCH RBC QN AUTO: 25.2 PG (ref 26–34)
MCHC RBC AUTO-ENTMCNC: 29.3 G/DL (ref 31–36)
MCHC RBC AUTO-ENTMCNC: 30.2 G/DL (ref 31–36)
MCV RBC AUTO: 83.6 FL (ref 80–100)
MCV RBC AUTO: 84.6 FL (ref 80–100)
MICROCYTES: ABNORMAL
MONOCYTES ABSOLUTE: 0.1 K/UL (ref 0–1.3)
MONOCYTES ABSOLUTE: 0.4 K/UL (ref 0–1.3)
MONOCYTES RELATIVE PERCENT: 2 %
MONOCYTES RELATIVE PERCENT: 8.9 %
NEUTROPHILS ABSOLUTE: 2.7 K/UL (ref 1.7–7.7)
NEUTROPHILS ABSOLUTE: 3.7 K/UL (ref 1.7–7.7)
NEUTROPHILS RELATIVE PERCENT: 84 %
NEUTROPHILS RELATIVE PERCENT: 84.9 %
OVALOCYTES: ABNORMAL
PDW BLD-RTO: 22.5 % (ref 12.4–15.4)
PDW BLD-RTO: 23 % (ref 12.4–15.4)
PLATELET # BLD: 90 K/UL (ref 135–450)
PLATELET # BLD: 95 K/UL (ref 135–450)
PLATELET SLIDE REVIEW: ABNORMAL
PMV BLD AUTO: 9.4 FL (ref 5–10.5)
PMV BLD AUTO: 9.6 FL (ref 5–10.5)
POLYCHROMASIA: ABNORMAL
POTASSIUM REFLEX MAGNESIUM: 6.3 MMOL/L (ref 3.5–5.1)
RBC # BLD: 3.47 M/UL (ref 4.2–5.9)
RBC # BLD: 3.54 M/UL (ref 4.2–5.9)
SCHISTOCYTES: ABNORMAL
SLIDE REVIEW: ABNORMAL
SODIUM BLD-SCNC: 134 MMOL/L (ref 136–145)
TOTAL PROTEIN: 6.6 G/DL (ref 6.4–8.2)
URINE CULTURE, ROUTINE: NORMAL
WBC # BLD: 3.2 K/UL (ref 4–11)
WBC # BLD: 4.3 K/UL (ref 4–11)

## 2019-09-29 PROCEDURE — 85025 COMPLETE CBC W/AUTO DIFF WBC: CPT

## 2019-09-29 PROCEDURE — 93010 ELECTROCARDIOGRAM REPORT: CPT | Performed by: INTERNAL MEDICINE

## 2019-09-29 PROCEDURE — 80053 COMPREHEN METABOLIC PANEL: CPT

## 2019-09-29 PROCEDURE — 94760 N-INVAS EAR/PLS OXIMETRY 1: CPT

## 2019-09-29 PROCEDURE — P9047 ALBUMIN (HUMAN), 25%, 50ML: HCPCS | Performed by: INTERNAL MEDICINE

## 2019-09-29 PROCEDURE — 6370000000 HC RX 637 (ALT 250 FOR IP): Performed by: HOSPITALIST

## 2019-09-29 PROCEDURE — 2580000003 HC RX 258: Performed by: INTERNAL MEDICINE

## 2019-09-29 PROCEDURE — 6360000002 HC RX W HCPCS: Performed by: INTERNAL MEDICINE

## 2019-09-29 PROCEDURE — 94640 AIRWAY INHALATION TREATMENT: CPT

## 2019-09-29 PROCEDURE — 2500000003 HC RX 250 WO HCPCS: Performed by: INTERNAL MEDICINE

## 2019-09-29 PROCEDURE — 36415 COLL VENOUS BLD VENIPUNCTURE: CPT

## 2019-09-29 PROCEDURE — 6370000000 HC RX 637 (ALT 250 FOR IP): Performed by: INTERNAL MEDICINE

## 2019-09-29 PROCEDURE — 2700000000 HC OXYGEN THERAPY PER DAY

## 2019-09-29 PROCEDURE — 2580000003 HC RX 258

## 2019-09-29 PROCEDURE — 6360000002 HC RX W HCPCS: Performed by: HOSPITALIST

## 2019-09-29 RX ORDER — ATORVASTATIN CALCIUM 80 MG/1
80 TABLET, FILM COATED ORAL DAILY
Status: DISCONTINUED | OUTPATIENT
Start: 2019-09-29 | End: 2019-09-29 | Stop reason: HOSPADM

## 2019-09-29 RX ORDER — METHYLPREDNISOLONE SODIUM SUCCINATE 40 MG/ML
40 INJECTION, POWDER, LYOPHILIZED, FOR SOLUTION INTRAMUSCULAR; INTRAVENOUS ONCE
Status: COMPLETED | OUTPATIENT
Start: 2019-09-29 | End: 2019-09-29

## 2019-09-29 RX ORDER — FUROSEMIDE 10 MG/ML
80 INJECTION INTRAMUSCULAR; INTRAVENOUS ONCE
Status: COMPLETED | OUTPATIENT
Start: 2019-09-29 | End: 2019-09-29

## 2019-09-29 RX ORDER — 0.9 % SODIUM CHLORIDE 0.9 %
500 INTRAVENOUS SOLUTION INTRAVENOUS ONCE
Status: DISCONTINUED | OUTPATIENT
Start: 2019-09-29 | End: 2019-09-29 | Stop reason: HOSPADM

## 2019-09-29 RX ORDER — DEXTROSE MONOHYDRATE 25 G/50ML
25 INJECTION, SOLUTION INTRAVENOUS ONCE
Status: COMPLETED | OUTPATIENT
Start: 2019-09-29 | End: 2019-09-29

## 2019-09-29 RX ORDER — MORPHINE SULFATE 2 MG/ML
2 INJECTION, SOLUTION INTRAMUSCULAR; INTRAVENOUS
Status: DISCONTINUED | OUTPATIENT
Start: 2019-09-29 | End: 2019-09-29

## 2019-09-29 RX ORDER — PANTOPRAZOLE SODIUM 40 MG/1
40 TABLET, DELAYED RELEASE ORAL DAILY
Status: DISCONTINUED | OUTPATIENT
Start: 2019-09-29 | End: 2019-09-29 | Stop reason: HOSPADM

## 2019-09-29 RX ORDER — AMIODARONE HYDROCHLORIDE 200 MG/1
200 TABLET ORAL DAILY
Status: DISCONTINUED | OUTPATIENT
Start: 2019-09-29 | End: 2019-09-29 | Stop reason: HOSPADM

## 2019-09-29 RX ORDER — SODIUM CHLORIDE 0.9 % (FLUSH) 0.9 %
10 SYRINGE (ML) INJECTION PRN
Status: DISCONTINUED | OUTPATIENT
Start: 2019-09-29 | End: 2019-09-29 | Stop reason: HOSPADM

## 2019-09-29 RX ORDER — ACETAMINOPHEN 325 MG/1
650 TABLET ORAL EVERY 4 HOURS PRN
Status: DISCONTINUED | OUTPATIENT
Start: 2019-09-29 | End: 2019-09-29 | Stop reason: HOSPADM

## 2019-09-29 RX ORDER — ONDANSETRON 2 MG/ML
4 INJECTION INTRAMUSCULAR; INTRAVENOUS EVERY 6 HOURS PRN
Status: DISCONTINUED | OUTPATIENT
Start: 2019-09-29 | End: 2019-09-29 | Stop reason: HOSPADM

## 2019-09-29 RX ORDER — OXYCODONE HYDROCHLORIDE AND ACETAMINOPHEN 5; 325 MG/1; MG/1
1 TABLET ORAL EVERY 4 HOURS PRN
Status: DISCONTINUED | OUTPATIENT
Start: 2019-09-29 | End: 2019-09-29 | Stop reason: HOSPADM

## 2019-09-29 RX ORDER — MORPHINE SULFATE 4 MG/ML
4 INJECTION, SOLUTION INTRAMUSCULAR; INTRAVENOUS
Status: COMPLETED | OUTPATIENT
Start: 2019-09-29 | End: 2019-09-29

## 2019-09-29 RX ORDER — SODIUM CHLORIDE 9 MG/ML
INJECTION, SOLUTION INTRAVENOUS
Status: COMPLETED
Start: 2019-09-29 | End: 2019-09-29

## 2019-09-29 RX ORDER — SODIUM CHLORIDE 0.9 % (FLUSH) 0.9 %
10 SYRINGE (ML) INJECTION EVERY 12 HOURS SCHEDULED
Status: DISCONTINUED | OUTPATIENT
Start: 2019-09-29 | End: 2019-09-29 | Stop reason: HOSPADM

## 2019-09-29 RX ORDER — ASPIRIN 81 MG/1
81 TABLET, CHEWABLE ORAL DAILY
Status: DISCONTINUED | OUTPATIENT
Start: 2019-09-29 | End: 2019-09-29 | Stop reason: HOSPADM

## 2019-09-29 RX ORDER — IPRATROPIUM BROMIDE AND ALBUTEROL SULFATE 2.5; .5 MG/3ML; MG/3ML
1 SOLUTION RESPIRATORY (INHALATION)
Status: DISCONTINUED | OUTPATIENT
Start: 2019-09-29 | End: 2019-09-29 | Stop reason: HOSPADM

## 2019-09-29 RX ORDER — SODIUM POLYSTYRENE SULFONATE 15 G/60ML
15 SUSPENSION ORAL; RECTAL ONCE
Status: DISCONTINUED | OUTPATIENT
Start: 2019-09-29 | End: 2019-09-29 | Stop reason: HOSPADM

## 2019-09-29 RX ADMIN — ALBUMIN (HUMAN) 25 G: 0.25 INJECTION, SOLUTION INTRAVENOUS at 01:04

## 2019-09-29 RX ADMIN — IPRATROPIUM BROMIDE AND ALBUTEROL SULFATE 1 AMPULE: .5; 3 SOLUTION RESPIRATORY (INHALATION) at 16:33

## 2019-09-29 RX ADMIN — Medication 10 ML: at 09:02

## 2019-09-29 RX ADMIN — SODIUM BICARBONATE 50 MEQ: 84 INJECTION, SOLUTION INTRAVENOUS at 10:29

## 2019-09-29 RX ADMIN — FUROSEMIDE 80 MG: 10 INJECTION, SOLUTION INTRAMUSCULAR; INTRAVENOUS at 10:41

## 2019-09-29 RX ADMIN — SODIUM CHLORIDE 250 ML: 9 INJECTION, SOLUTION INTRAVENOUS at 01:21

## 2019-09-29 RX ADMIN — INSULIN HUMAN 10 UNITS: 100 INJECTION, SOLUTION PARENTERAL at 10:28

## 2019-09-29 RX ADMIN — ALBUMIN (HUMAN) 25 G: 0.25 INJECTION, SOLUTION INTRAVENOUS at 08:52

## 2019-09-29 RX ADMIN — METHYLPREDNISOLONE SODIUM SUCCINATE 40 MG: 40 INJECTION, POWDER, FOR SOLUTION INTRAMUSCULAR; INTRAVENOUS at 08:57

## 2019-09-29 RX ADMIN — MORPHINE SULFATE 4 MG: 4 INJECTION INTRAVENOUS at 08:57

## 2019-09-29 RX ADMIN — MORPHINE SULFATE 2 MG: 2 INJECTION, SOLUTION INTRAMUSCULAR; INTRAVENOUS at 06:02

## 2019-09-29 RX ADMIN — IPRATROPIUM BROMIDE AND ALBUTEROL SULFATE 1 AMPULE: .5; 3 SOLUTION RESPIRATORY (INHALATION) at 10:50

## 2019-09-29 RX ADMIN — DEXTROSE MONOHYDRATE 25 G: 25 INJECTION, SOLUTION INTRAVENOUS at 10:29

## 2019-09-29 RX ADMIN — CALCIUM GLUCONATE 1 G: 98 INJECTION, SOLUTION INTRAVENOUS at 10:28

## 2019-09-29 RX ADMIN — MORPHINE SULFATE 4 MG: 4 INJECTION INTRAVENOUS at 19:53

## 2019-09-29 RX ADMIN — MORPHINE SULFATE 2 MG: 2 INJECTION, SOLUTION INTRAMUSCULAR; INTRAVENOUS at 04:10

## 2019-09-29 ASSESSMENT — PAIN SCALES - GENERAL
PAINLEVEL_OUTOF10: 0
PAINLEVEL_OUTOF10: 9
PAINLEVEL_OUTOF10: 9
PAINLEVEL_OUTOF10: 10
PAINLEVEL_OUTOF10: 7
PAINLEVEL_OUTOF10: 5
PAINLEVEL_OUTOF10: 0
PAINLEVEL_OUTOF10: 9
PAINLEVEL_OUTOF10: 3
PAINLEVEL_OUTOF10: 9
PAINLEVEL_OUTOF10: 0
PAINLEVEL_OUTOF10: 2
PAINLEVEL_OUTOF10: 7

## 2019-09-29 ASSESSMENT — PAIN DESCRIPTION - DESCRIPTORS
DESCRIPTORS: PATIENT UNABLE TO DESCRIBE
DESCRIPTORS: PATIENT UNABLE TO DESCRIBE

## 2019-09-29 ASSESSMENT — PAIN DESCRIPTION - LOCATION
LOCATION: GENERALIZED
LOCATION: GENERALIZED

## 2019-09-29 ASSESSMENT — PAIN DESCRIPTION - PROGRESSION
CLINICAL_PROGRESSION: NOT CHANGED

## 2019-09-29 ASSESSMENT — PAIN DESCRIPTION - ONSET
ONSET: ON-GOING
ONSET: ON-GOING

## 2019-09-29 ASSESSMENT — PAIN DESCRIPTION - FREQUENCY
FREQUENCY: CONTINUOUS
FREQUENCY: CONTINUOUS

## 2019-09-29 ASSESSMENT — PAIN DESCRIPTION - PAIN TYPE
TYPE: CHRONIC PAIN
TYPE: CHRONIC PAIN

## 2019-09-29 NOTE — PROGRESS NOTES
RN met with family and had a conversation at length regarding prognosis. Family would like to sign on with hospice.

## 2019-09-29 NOTE — CONSULTS
19*   * 88* 101*   CREATININE 4.3* 3.2* 4.1*   GLUCOSE 216* 207* 170*     Ca/Mg/Phos: Recent Labs     09/28/19  1502 09/28/19  2030 09/29/19  0448   CALCIUM 8.2* 6.5* 8.4     Hepatic: Recent Labs     09/28/19  1502 09/29/19  0448   AST 27 24   ALT 28 28   BILITOT 0.9 1.1*   ALKPHOS 214* 192*     Troponin:   Recent Labs     09/28/19  1502   TROPONINI 0.10*     BNP: No results for input(s): BNP in the last 72 hours. Lipids: No results for input(s): CHOL, TRIG, HDL, LDLCALC, LABVLDL in the last 72 hours. ABGs: No results for input(s): PHART, PO2ART, SBU6HSK in the last 72 hours. INR:   Recent Labs     09/28/19  1502   INR 2.22*     UA:  Recent Labs     09/28/19  1502   COLORU Brown   CLARITYU CLOUDY*   GLUCOSEU Negative   BILIRUBINUR MODERATE*   KETUA 15*   SPECGRAV 1.025   BLOODU LARGE*   PHUR 5.0   PROTEINU >=300*   UROBILINOGEN 1.0   NITRU Negative   LEUKOCYTESUR TRACE*   LABMICR YES   URINETYPE Not Specified      Urine Microscopic: Recent Labs     09/28/19  1502   LABCAST 3-5 Coarse Gran*   BACTERIA 3+*   WBCUA >100*   RBCUA *   EPIU 10-20     Urine Culture: No results for input(s): Tyree Else in the last 72 hours. Urine Chemistry: No results for input(s): Kelli Poisson, PROTEINUR, NAUR in the last 72 hours. IMAGING:  CT ABDOMEN PELVIS WO CONTRAST Additional Contrast? None   Final Result   1. Severe anasarca with small to moderate amount of ascites and moderate to   large partially imaged right pleural effusion and small left pleural effusion   with associated atelectasis. 2. 1.1 cm stone at the level of the right ureteropelvic junction with no   evidence for hydronephrosis to suggest obstruction at this time. 3. No bowel obstruction identified. 4. Severe atherosclerotic disease. Findings were discussed with Adeline Montesinos MD at 8:50 pm on 9/28/2019. CT head without contrast   Preliminary Result   No acute intracranial abnormality.       Chronic small vessel ischemic white

## 2019-09-29 NOTE — PROGRESS NOTES
Telephone report received from [facility]. Notified to stop Lasix gtt per Dr. Melany Ortiz note.  Milagros Alfaro RN, BSN, CCRN, 12:02 AM

## 2019-09-29 NOTE — H&P
Historical Provider, MD   oxyCODONE-acetaminophen (PERCOCET) 5-325 MG per tablet Take 1 tablet by mouth every 4 hours as needed for Pain. Historical Provider, MD   b complex vitamins tablet Take 1 tablet by mouth daily    Historical Provider, MD   bisacodyl (DULCOLAX) 5 MG EC tablet Take 10 mg by mouth daily as needed 7/17/19   Cass Provider, MD   docusate sodium (COLACE) 100 MG capsule Take 100 mg by mouth daily as needed    Historical Provider, MD   Polysaccharide Iron Complex 391.3 (180 Fe) MG CAPS Take 180 mg by mouth daily    Historical Provider, MD   metFORMIN (GLUCOPHAGE) 1000 MG tablet Take 500 mg by mouth 2 times daily (with meals)    Historical Provider, MD   Blood Glucose Monitoring Suppl (YuMe CONTOUR MONITOR) W/DEVICE KIT by Does not apply route. Historical Provider, MD   metronidazole (METROGEL) 1 % gel Apply  topically as needed. Apply topically daily. Historical Provider, MD   nystatin (MYCOSTATIN) cream Apply  topically as needed. Apply topically 2 times daily. Historical Provider, MD       Allergies:  Patient has no known allergies. Social History:  The patient currently lives nursing home    TOBACCO:   reports that he has never smoked. He has never used smokeless tobacco.  ETOH:   reports that he does not drink alcohol. Family History:  Reviewed in detail and negative for DM, Early CAD, Cancer, CVA. Positive as follows:    History reviewed. No pertinent family history. REVIEW OF SYSTEMS:   Unobtainable due to altered mentation. PHYSICAL EXAM:    BP (!) 83/50   Pulse 67   Temp 96.7 °F (35.9 °C) (Infrared)   Resp 15   Wt 275 lb (124.7 kg)   SpO2 99%   BMI 35.31 kg/m²     General appearance: No apparent distress appears stated age and cooperative. Following simple commands but poor historian  HEENT Normal cephalic, atraumatic without obvious deformity. Pupils equal, round, and reactive to light. Extra ocular muscles intact. Conjunctivae/corneas clear.   Neck:

## 2019-09-29 NOTE — PROGRESS NOTES
Received from ER per bed to ICU 14. Bedside monitor applied, AV pacing noted. BP soft, MD aware. Lung sounds diminished throughout. O2 sats difficult to obtain d/t cold extremities. Mild sleep apnae noted, 2LNC. Heidi care provided and bed pad changed. Small soft BM/small amount of bloody/mucus stool noted. Heidi area/scrotum/penis swollen. Interdry place to groin. Barrier cream and mepilex to coccyx. Scattered wounds noted to BLE, dressings removed. Pt states that he scratches himself a lot. Wounds irrigated with NS. Non-adherent, ABD, and Kerlix applied. Purulent drainage noted from old dressings. Oriented to room and call system. Plan of care, medications, and safety reviewed. Admission assessment completed. No visitors present at time of admission. Patient is resting in bed at this time, currently semi fowlers. Denies additional needs. Call light is in reach and bed alarm is engaged, encouraged to call for assistance when needed. Pt is a high fall risk. Will continue to monitor.  Capo Evans RN, BSN, CCRN, 2:09 AM

## 2019-10-03 LAB
BLOOD CULTURE, ROUTINE: NORMAL
CULTURE, BLOOD 2: NORMAL
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
## 2019-10-28 PROBLEM — N39.0 UTI (URINARY TRACT INFECTION): Status: RESOLVED | Noted: 2019-09-28 | Resolved: 2019-10-28
